# Patient Record
Sex: FEMALE | Race: WHITE | NOT HISPANIC OR LATINO | Employment: OTHER | URBAN - METROPOLITAN AREA
[De-identification: names, ages, dates, MRNs, and addresses within clinical notes are randomized per-mention and may not be internally consistent; named-entity substitution may affect disease eponyms.]

---

## 2018-06-13 PROBLEM — Z98.890 H/O LEFT KNEE SURGERY: Status: ACTIVE | Noted: 2018-06-13

## 2018-06-13 PROBLEM — M70.71 BILATERAL HIP BURSITIS: Status: ACTIVE | Noted: 2018-06-13

## 2018-06-13 PROBLEM — M46.1 SACROILIITIS: Status: ACTIVE | Noted: 2018-06-13

## 2018-06-13 PROBLEM — M70.72 BILATERAL HIP BURSITIS: Status: ACTIVE | Noted: 2018-06-13

## 2018-06-13 PROBLEM — M54.16 LUMBAR RADICULITIS: Status: ACTIVE | Noted: 2018-06-13

## 2018-07-02 ENCOUNTER — HOSPITAL ENCOUNTER (OUTPATIENT)
Dept: RADIOLOGY | Facility: HOSPITAL | Age: 48
Discharge: HOME | End: 2018-07-02
Attending: SPECIALIST
Payer: OTHER GOVERNMENT

## 2018-07-02 DIAGNOSIS — M54.16 LUMBAR RADICULOPATHY: ICD-10-CM

## 2018-07-02 PROCEDURE — 72148 MRI LUMBAR SPINE W/O DYE: CPT | Mod: TC

## 2018-07-02 PROCEDURE — 72148 MRI LUMBAR SPINE W/O DYE: CPT | Mod: 26,,, | Performed by: RADIOLOGY

## 2018-07-20 PROBLEM — M51.369 BULGING LUMBAR DISC: Status: ACTIVE | Noted: 2018-07-20

## 2018-07-20 PROBLEM — M47.816 LUMBAR SPONDYLOSIS: Status: ACTIVE | Noted: 2018-07-20

## 2018-07-20 PROBLEM — M54.16 LUMBAR RADICULOPATHY: Status: ACTIVE | Noted: 2018-07-20

## 2018-07-20 PROBLEM — M51.36 BULGING LUMBAR DISC: Status: ACTIVE | Noted: 2018-07-20

## 2018-07-20 PROBLEM — M48.061 NEURAL FORAMINAL STENOSIS OF LUMBAR SPINE: Status: ACTIVE | Noted: 2018-07-20

## 2018-07-20 PROBLEM — M51.26 PROTRUSION OF LUMBAR INTERVERTEBRAL DISC: Status: ACTIVE | Noted: 2018-07-20

## 2022-01-17 DIAGNOSIS — Z12.31 ENCOUNTER FOR SCREENING MAMMOGRAM FOR MALIGNANT NEOPLASM OF BREAST: Primary | ICD-10-CM

## 2022-06-20 ENCOUNTER — HOSPITAL ENCOUNTER (OUTPATIENT)
Dept: RADIOLOGY | Facility: HOSPITAL | Age: 52
Discharge: HOME | End: 2022-06-20
Attending: OBSTETRICS & GYNECOLOGY
Payer: OTHER GOVERNMENT

## 2022-06-20 DIAGNOSIS — Z12.31 ENCOUNTER FOR SCREENING MAMMOGRAM FOR MALIGNANT NEOPLASM OF BREAST: ICD-10-CM

## 2022-06-20 PROCEDURE — 77063 BREAST TOMOSYNTHESIS BI: CPT | Mod: TC,PO

## 2023-03-13 ENCOUNTER — HOSPITAL ENCOUNTER (OUTPATIENT)
Dept: RADIOLOGY | Facility: HOSPITAL | Age: 53
Discharge: HOME | End: 2023-03-13
Attending: EMERGENCY MEDICINE
Payer: OTHER GOVERNMENT

## 2023-03-13 DIAGNOSIS — M25.571 RIGHT ANKLE PAIN: Primary | ICD-10-CM

## 2023-03-13 DIAGNOSIS — M25.571 RIGHT ANKLE PAIN: ICD-10-CM

## 2023-03-13 PROCEDURE — 73600 X-RAY EXAM OF ANKLE: CPT | Mod: TC,PO,RT

## 2023-12-20 DIAGNOSIS — Z12.31 ENCOUNTER FOR SCREENING MAMMOGRAM FOR MALIGNANT NEOPLASM OF BREAST: Primary | ICD-10-CM

## 2024-01-27 ENCOUNTER — HOSPITAL ENCOUNTER (INPATIENT)
Facility: HOSPITAL | Age: 54
LOS: 3 days | Discharge: HOME | DRG: 287 | End: 2024-02-01
Attending: EMERGENCY MEDICINE | Admitting: INTERNAL MEDICINE
Payer: OTHER GOVERNMENT

## 2024-01-27 DIAGNOSIS — R55 CARDIAC RELATED SYNCOPE: ICD-10-CM

## 2024-01-27 DIAGNOSIS — R55 SYNCOPE: Primary | ICD-10-CM

## 2024-01-27 DIAGNOSIS — R07.9 CHEST PAIN: ICD-10-CM

## 2024-01-27 DIAGNOSIS — R94.39 POSITIVE CARDIAC STRESS TEST: ICD-10-CM

## 2024-01-27 DIAGNOSIS — I44.0 FIRST DEGREE AV BLOCK: ICD-10-CM

## 2024-01-27 DIAGNOSIS — I25.10 CAD (CORONARY ARTERY DISEASE): ICD-10-CM

## 2024-01-27 DIAGNOSIS — E87.6 HYPOKALEMIA: ICD-10-CM

## 2024-01-27 LAB
ALBUMIN SERPL BCP-MCNC: 4 G/DL (ref 3.5–5.2)
ALP SERPL-CCNC: 81 U/L (ref 55–135)
ALT SERPL W/O P-5'-P-CCNC: 17 U/L (ref 10–44)
ANION GAP SERPL CALC-SCNC: 11 MMOL/L (ref 8–16)
AST SERPL-CCNC: 16 U/L (ref 10–40)
BASOPHILS # BLD AUTO: 0.1 K/UL (ref 0–0.2)
BASOPHILS NFR BLD: 0.9 % (ref 0–1.9)
BILIRUB SERPL-MCNC: 0.4 MG/DL (ref 0.1–1)
BUN SERPL-MCNC: 17 MG/DL (ref 6–20)
CALCIUM SERPL-MCNC: 9.3 MG/DL (ref 8.7–10.5)
CHLORIDE SERPL-SCNC: 99 MMOL/L (ref 95–110)
CO2 SERPL-SCNC: 25 MMOL/L (ref 23–29)
CREAT SERPL-MCNC: 1.5 MG/DL (ref 0.5–1.4)
DIFFERENTIAL METHOD BLD: ABNORMAL
EOSINOPHIL # BLD AUTO: 0.1 K/UL (ref 0–0.5)
EOSINOPHIL NFR BLD: 0.6 % (ref 0–8)
ERYTHROCYTE [DISTWIDTH] IN BLOOD BY AUTOMATED COUNT: 13.2 % (ref 11.5–14.5)
EST. GFR  (NO RACE VARIABLE): 41.9 ML/MIN/1.73 M^2
GLUCOSE SERPL-MCNC: 117 MG/DL (ref 70–110)
GLUCOSE SERPL-MCNC: 155 MG/DL (ref 70–110)
HCT VFR BLD AUTO: 39 % (ref 37–48.5)
HGB BLD-MCNC: 12.8 G/DL (ref 12–16)
IMM GRANULOCYTES # BLD AUTO: 0.06 K/UL (ref 0–0.04)
IMM GRANULOCYTES NFR BLD AUTO: 0.5 % (ref 0–0.5)
LACTATE SERPL-SCNC: 2.5 MMOL/L (ref 0.5–1.9)
LYMPHOCYTES # BLD AUTO: 4.7 K/UL (ref 1–4.8)
LYMPHOCYTES NFR BLD: 41.2 % (ref 18–48)
MAGNESIUM SERPL-MCNC: 1.6 MG/DL (ref 1.6–2.6)
MCH RBC QN AUTO: 28.2 PG (ref 27–31)
MCHC RBC AUTO-ENTMCNC: 32.8 G/DL (ref 32–36)
MCV RBC AUTO: 86 FL (ref 82–98)
MONOCYTES # BLD AUTO: 0.8 K/UL (ref 0.3–1)
MONOCYTES NFR BLD: 6.7 % (ref 4–15)
NEUTROPHILS # BLD AUTO: 5.7 K/UL (ref 1.8–7.7)
NEUTROPHILS NFR BLD: 50.1 % (ref 38–73)
NRBC BLD-RTO: 0 /100 WBC
PHOSPHATE SERPL-MCNC: 3.5 MG/DL (ref 2.7–4.5)
PLATELET # BLD AUTO: 331 K/UL (ref 150–450)
PMV BLD AUTO: 10.1 FL (ref 9.2–12.9)
POTASSIUM SERPL-SCNC: 2.9 MMOL/L (ref 3.5–5.1)
PROT SERPL-MCNC: 7 G/DL (ref 6–8.4)
RBC # BLD AUTO: 4.54 M/UL (ref 4–5.4)
SODIUM SERPL-SCNC: 135 MMOL/L (ref 136–145)
TROPONIN I SERPL HS-MCNC: 3.5 PG/ML (ref 0–14.9)
WBC # BLD AUTO: 11.42 K/UL (ref 3.9–12.7)

## 2024-01-27 PROCEDURE — 93005 ELECTROCARDIOGRAM TRACING: CPT | Performed by: GENERAL PRACTICE

## 2024-01-27 PROCEDURE — 99285 EMERGENCY DEPT VISIT HI MDM: CPT | Mod: 25

## 2024-01-27 PROCEDURE — 93010 ELECTROCARDIOGRAM REPORT: CPT | Mod: ,,, | Performed by: GENERAL PRACTICE

## 2024-01-27 PROCEDURE — 85025 COMPLETE CBC W/AUTO DIFF WBC: CPT | Performed by: STUDENT IN AN ORGANIZED HEALTH CARE EDUCATION/TRAINING PROGRAM

## 2024-01-27 PROCEDURE — 82962 GLUCOSE BLOOD TEST: CPT

## 2024-01-27 PROCEDURE — 83605 ASSAY OF LACTIC ACID: CPT | Performed by: STUDENT IN AN ORGANIZED HEALTH CARE EDUCATION/TRAINING PROGRAM

## 2024-01-27 PROCEDURE — 84100 ASSAY OF PHOSPHORUS: CPT | Performed by: STUDENT IN AN ORGANIZED HEALTH CARE EDUCATION/TRAINING PROGRAM

## 2024-01-27 PROCEDURE — 96361 HYDRATE IV INFUSION ADD-ON: CPT

## 2024-01-27 PROCEDURE — 83735 ASSAY OF MAGNESIUM: CPT | Performed by: STUDENT IN AN ORGANIZED HEALTH CARE EDUCATION/TRAINING PROGRAM

## 2024-01-27 PROCEDURE — 63600175 PHARM REV CODE 636 W HCPCS: Performed by: STUDENT IN AN ORGANIZED HEALTH CARE EDUCATION/TRAINING PROGRAM

## 2024-01-27 PROCEDURE — 84484 ASSAY OF TROPONIN QUANT: CPT | Performed by: STUDENT IN AN ORGANIZED HEALTH CARE EDUCATION/TRAINING PROGRAM

## 2024-01-27 PROCEDURE — 80053 COMPREHEN METABOLIC PANEL: CPT | Performed by: STUDENT IN AN ORGANIZED HEALTH CARE EDUCATION/TRAINING PROGRAM

## 2024-01-27 RX ORDER — CHOLECALCIFEROL (VITAMIN D3) 25 MCG
1000 TABLET ORAL DAILY
COMMUNITY

## 2024-01-27 RX ORDER — FAMOTIDINE 20 MG/1
20 TABLET, FILM COATED ORAL NIGHTLY
COMMUNITY

## 2024-01-27 RX ORDER — HYDROCHLOROTHIAZIDE 25 MG/1
25 TABLET ORAL DAILY
Status: ON HOLD | COMMUNITY
End: 2024-02-01 | Stop reason: HOSPADM

## 2024-01-27 RX ORDER — EZETIMIBE 10 MG/1
10 TABLET ORAL DAILY
COMMUNITY

## 2024-01-27 RX ORDER — GABAPENTIN 300 MG/1
300 CAPSULE ORAL 2 TIMES DAILY
COMMUNITY

## 2024-01-27 RX ORDER — NEBIVOLOL 10 MG/1
10 TABLET ORAL NIGHTLY
Status: ON HOLD | COMMUNITY
End: 2024-02-01 | Stop reason: HOSPADM

## 2024-01-27 RX ORDER — ATORVASTATIN CALCIUM 40 MG/1
40 TABLET, FILM COATED ORAL EVERY MORNING
COMMUNITY

## 2024-01-27 RX ORDER — MAGNESIUM SULFATE HEPTAHYDRATE 40 MG/ML
2 INJECTION, SOLUTION INTRAVENOUS ONCE
Status: COMPLETED | OUTPATIENT
Start: 2024-01-28 | End: 2024-01-28

## 2024-01-27 RX ORDER — VALSARTAN 80 MG/1
80 TABLET ORAL DAILY
Status: ON HOLD | COMMUNITY
End: 2024-02-01 | Stop reason: HOSPADM

## 2024-01-27 RX ADMIN — SODIUM CHLORIDE, POTASSIUM CHLORIDE, SODIUM LACTATE AND CALCIUM CHLORIDE 1000 ML: 600; 310; 30; 20 INJECTION, SOLUTION INTRAVENOUS at 11:01

## 2024-01-27 NOTE — Clinical Note
The catheter was inserted into the and was inserted over the wire into the ostium   right coronary artery. An angiography was performed of the right coronary arteries. Multiple views were taken. The angiography was performed via power injection. The injected amount was 6 mL contrast at 3 mL/s. The PSI from the power injection was 300.

## 2024-01-28 ENCOUNTER — CLINICAL SUPPORT (OUTPATIENT)
Dept: CARDIOLOGY | Facility: HOSPITAL | Age: 54
DRG: 287 | End: 2024-01-28
Attending: INTERNAL MEDICINE
Payer: OTHER GOVERNMENT

## 2024-01-28 VITALS — WEIGHT: 250 LBS | HEIGHT: 67 IN | BODY MASS INDEX: 39.24 KG/M2

## 2024-01-28 PROBLEM — E87.6 HYPOKALEMIA: Status: ACTIVE | Noted: 2024-01-28

## 2024-01-28 PROBLEM — R55 SYNCOPE: Status: ACTIVE | Noted: 2024-01-28

## 2024-01-28 PROBLEM — I10 HYPERTENSION: Chronic | Status: ACTIVE | Noted: 2024-01-28

## 2024-01-28 PROBLEM — K21.9 GERD (GASTROESOPHAGEAL REFLUX DISEASE): Chronic | Status: ACTIVE | Noted: 2024-01-28

## 2024-01-28 PROBLEM — N17.9 AKI (ACUTE KIDNEY INJURY): Status: ACTIVE | Noted: 2024-01-28

## 2024-01-28 PROBLEM — G62.9 NEUROPATHY: Chronic | Status: ACTIVE | Noted: 2024-01-28

## 2024-01-28 PROBLEM — E87.20 LACTIC ACIDOSIS: Status: ACTIVE | Noted: 2024-01-28

## 2024-01-28 PROBLEM — E66.9 OBESITY: Chronic | Status: ACTIVE | Noted: 2024-01-28

## 2024-01-28 LAB
ANION GAP SERPL CALC-SCNC: 10 MMOL/L (ref 8–16)
AORTIC ROOT ANNULUS: 3.7 CM
AORTIC VALVE CUSP SEPERATION: 2.3 CM
AV INDEX (PROSTH): 0.84
AV MEAN GRADIENT: 3 MMHG
AV PEAK GRADIENT: 5 MMHG
AV VALVE AREA BY VELOCITY RATIO: 3.42 CM²
AV VALVE AREA: 3.48 CM²
AV VELOCITY RATIO: 0.82
BACTERIA #/AREA URNS HPF: NEGATIVE /HPF
BILIRUB UR QL STRIP: NEGATIVE
BSA FOR ECHO PROCEDURE: 2.32 M2
BUN SERPL-MCNC: 15 MG/DL (ref 6–20)
CALCIUM SERPL-MCNC: 9 MG/DL (ref 8.7–10.5)
CHLORIDE SERPL-SCNC: 102 MMOL/L (ref 95–110)
CLARITY UR: ABNORMAL
CO2 SERPL-SCNC: 25 MMOL/L (ref 23–29)
COLOR UR: YELLOW
CREAT SERPL-MCNC: 1 MG/DL (ref 0.5–1.4)
CV ECHO LV RWT: 0.41 CM
DOP CALC AO PEAK VEL: 1.08 M/S
DOP CALC AO VTI: 23.9 CM
DOP CALC LVOT AREA: 4.2 CM2
DOP CALC LVOT DIAMETER: 2.3 CM
DOP CALC LVOT PEAK VEL: 0.89 M/S
DOP CALC LVOT STROKE VOLUME: 83.05 CM3
DOP CALC MV VTI: 26.9 CM
DOP CALCLVOT PEAK VEL VTI: 20 CM
E WAVE DECELERATION TIME: 183 MSEC
E/A RATIO: 0.92
E/E' RATIO: 6.88 M/S
ECHO LV POSTERIOR WALL: 1.04 CM (ref 0.6–1.1)
ERYTHROCYTE [DISTWIDTH] IN BLOOD BY AUTOMATED COUNT: 13.2 % (ref 11.5–14.5)
EST. GFR  (NO RACE VARIABLE): >60 ML/MIN/1.73 M^2
ETHANOL SERPL-MCNC: <10 MG/DL
FRACTIONAL SHORTENING: 30 % (ref 28–44)
GLUCOSE SERPL-MCNC: 108 MG/DL (ref 70–110)
GLUCOSE UR QL STRIP: NEGATIVE
HCT VFR BLD AUTO: 35.2 % (ref 37–48.5)
HGB BLD-MCNC: 11.8 G/DL (ref 12–16)
HGB UR QL STRIP: NEGATIVE
HYALINE CASTS #/AREA URNS LPF: 3 /LPF
INTERVENTRICULAR SEPTUM: 0.83 CM (ref 0.6–1.1)
KETONES UR QL STRIP: NEGATIVE
LACTATE SERPL-SCNC: 1.3 MMOL/L (ref 0.5–1.9)
LEFT INTERNAL DIMENSION IN SYSTOLE: 3.53 CM (ref 2.1–4)
LEFT VENTRICLE DIASTOLIC VOLUME INDEX: 53.6 ML/M2
LEFT VENTRICLE DIASTOLIC VOLUME: 119 ML
LEFT VENTRICLE MASS INDEX: 75 G/M2
LEFT VENTRICLE SYSTOLIC VOLUME INDEX: 23.4 ML/M2
LEFT VENTRICLE SYSTOLIC VOLUME: 51.9 ML
LEFT VENTRICULAR INTERNAL DIMENSION IN DIASTOLE: 5.02 CM (ref 3.5–6)
LEFT VENTRICULAR MASS: 167.48 G
LEUKOCYTE ESTERASE UR QL STRIP: ABNORMAL
LV LATERAL E/E' RATIO: 5.06 M/S
LV SEPTAL E/E' RATIO: 10.75 M/S
LVOT MG: 2 MMHG
LVOT MV: 0.62 CM/S
MAGNESIUM SERPL-MCNC: 2.3 MG/DL (ref 1.6–2.6)
MCH RBC QN AUTO: 28.4 PG (ref 27–31)
MCHC RBC AUTO-ENTMCNC: 33.5 G/DL (ref 32–36)
MCV RBC AUTO: 85 FL (ref 82–98)
MICROSCOPIC COMMENT: ABNORMAL
MV MEAN GRADIENT: 2 MMHG
MV PEAK A VEL: 0.93 M/S
MV PEAK E VEL: 0.86 M/S
MV PEAK GRADIENT: 4 MMHG
MV STENOSIS PRESSURE HALF TIME: 68 MS
MV VALVE AREA BY CONTINUITY EQUATION: 3.09 CM2
MV VALVE AREA P 1/2 METHOD: 3.24 CM2
NITRITE UR QL STRIP: NEGATIVE
PH UR STRIP: 6 [PH] (ref 5–8)
PHOSPHATE SERPL-MCNC: 3.8 MG/DL (ref 2.7–4.5)
PISA TR MAX VEL: 1.96 M/S
PLATELET # BLD AUTO: 275 K/UL (ref 150–450)
PMV BLD AUTO: 10.4 FL (ref 9.2–12.9)
POTASSIUM SERPL-SCNC: 3.9 MMOL/L (ref 3.5–5.1)
PROT UR QL STRIP: NEGATIVE
PV MV: 0.68 M/S
PV PEAK GRADIENT: 4 MMHG
PV PEAK VELOCITY: 0.99 M/S
RA PRESSURE ESTIMATED: 3 MMHG
RBC # BLD AUTO: 4.15 M/UL (ref 4–5.4)
RBC #/AREA URNS HPF: 3 /HPF (ref 0–4)
RIGHT VENTRICULAR END-DIASTOLIC DIMENSION: 2.52 CM
RV TB RVSP: 5 MMHG
RV TISSUE DOPPLER FREE WALL SYSTOLIC VELOCITY 1 (APICAL 4 CHAMBER VIEW): 11.9 CM/S
SARS-COV-2 RDRP RESP QL NAA+PROBE: NEGATIVE
SODIUM SERPL-SCNC: 137 MMOL/L (ref 136–145)
SP GR UR STRIP: 1.01 (ref 1–1.03)
SQUAMOUS #/AREA URNS HPF: 2 /HPF
TDI LATERAL: 0.17 M/S
TDI SEPTAL: 0.08 M/S
TDI: 0.13 M/S
TR MAX PG: 15 MMHG
TRICUSPID ANNULAR PLANE SYSTOLIC EXCURSION: 2.2 CM
TROPONIN I SERPL HS-MCNC: 3.2 PG/ML (ref 0–14.9)
TV REST PULMONARY ARTERY PRESSURE: 18 MMHG
URN SPEC COLLECT METH UR: ABNORMAL
UROBILINOGEN UR STRIP-ACNC: NEGATIVE EU/DL
WBC # BLD AUTO: 8.35 K/UL (ref 3.9–12.7)
WBC #/AREA URNS HPF: 6 /HPF (ref 0–5)
Z-SCORE OF LEFT VENTRICULAR DIMENSION IN END DIASTOLE: -4.35
Z-SCORE OF LEFT VENTRICULAR DIMENSION IN END SYSTOLE: -2.27

## 2024-01-28 PROCEDURE — 80048 BASIC METABOLIC PNL TOTAL CA: CPT | Performed by: INTERNAL MEDICINE

## 2024-01-28 PROCEDURE — 25000003 PHARM REV CODE 250: Performed by: STUDENT IN AN ORGANIZED HEALTH CARE EDUCATION/TRAINING PROGRAM

## 2024-01-28 PROCEDURE — 36415 COLL VENOUS BLD VENIPUNCTURE: CPT | Performed by: EMERGENCY MEDICINE

## 2024-01-28 PROCEDURE — 25000003 PHARM REV CODE 250: Performed by: INTERNAL MEDICINE

## 2024-01-28 PROCEDURE — 82077 ASSAY SPEC XCP UR&BREATH IA: CPT | Performed by: EMERGENCY MEDICINE

## 2024-01-28 PROCEDURE — 93306 TTE W/DOPPLER COMPLETE: CPT

## 2024-01-28 PROCEDURE — 83735 ASSAY OF MAGNESIUM: CPT | Performed by: INTERNAL MEDICINE

## 2024-01-28 PROCEDURE — 96365 THER/PROPH/DIAG IV INF INIT: CPT

## 2024-01-28 PROCEDURE — 84484 ASSAY OF TROPONIN QUANT: CPT | Performed by: EMERGENCY MEDICINE

## 2024-01-28 PROCEDURE — U0002 COVID-19 LAB TEST NON-CDC: HCPCS | Performed by: INTERNAL MEDICINE

## 2024-01-28 PROCEDURE — 81001 URINALYSIS AUTO W/SCOPE: CPT | Performed by: INTERNAL MEDICINE

## 2024-01-28 PROCEDURE — 63600175 PHARM REV CODE 636 W HCPCS: Performed by: INTERNAL MEDICINE

## 2024-01-28 PROCEDURE — 96361 HYDRATE IV INFUSION ADD-ON: CPT

## 2024-01-28 PROCEDURE — G0378 HOSPITAL OBSERVATION PER HR: HCPCS

## 2024-01-28 PROCEDURE — 63600175 PHARM REV CODE 636 W HCPCS: Performed by: STUDENT IN AN ORGANIZED HEALTH CARE EDUCATION/TRAINING PROGRAM

## 2024-01-28 PROCEDURE — 93306 TTE W/DOPPLER COMPLETE: CPT | Mod: 26,,, | Performed by: GENERAL PRACTICE

## 2024-01-28 PROCEDURE — 85027 COMPLETE CBC AUTOMATED: CPT | Performed by: INTERNAL MEDICINE

## 2024-01-28 PROCEDURE — 84100 ASSAY OF PHOSPHORUS: CPT | Performed by: INTERNAL MEDICINE

## 2024-01-28 PROCEDURE — 83605 ASSAY OF LACTIC ACID: CPT | Performed by: INTERNAL MEDICINE

## 2024-01-28 PROCEDURE — 96366 THER/PROPH/DIAG IV INF ADDON: CPT

## 2024-01-28 RX ORDER — ATORVASTATIN CALCIUM 40 MG/1
40 TABLET, FILM COATED ORAL NIGHTLY
Status: DISCONTINUED | OUTPATIENT
Start: 2024-01-28 | End: 2024-02-01 | Stop reason: HOSPADM

## 2024-01-28 RX ORDER — IBUPROFEN 200 MG
16 TABLET ORAL
Status: DISCONTINUED | OUTPATIENT
Start: 2024-01-28 | End: 2024-02-01 | Stop reason: HOSPADM

## 2024-01-28 RX ORDER — SODIUM CHLORIDE 0.9 % (FLUSH) 0.9 %
10 SYRINGE (ML) INJECTION EVERY 6 HOURS PRN
Status: DISCONTINUED | OUTPATIENT
Start: 2024-01-28 | End: 2024-02-01 | Stop reason: HOSPADM

## 2024-01-28 RX ORDER — SODIUM,POTASSIUM PHOSPHATES 280-250MG
2 POWDER IN PACKET (EA) ORAL
Status: DISCONTINUED | OUTPATIENT
Start: 2024-01-28 | End: 2024-02-01 | Stop reason: HOSPADM

## 2024-01-28 RX ORDER — GLUCAGON 1 MG
1 KIT INJECTION
Status: DISCONTINUED | OUTPATIENT
Start: 2024-01-28 | End: 2024-02-01 | Stop reason: HOSPADM

## 2024-01-28 RX ORDER — IBUPROFEN 200 MG
24 TABLET ORAL
Status: DISCONTINUED | OUTPATIENT
Start: 2024-01-28 | End: 2024-02-01 | Stop reason: HOSPADM

## 2024-01-28 RX ORDER — GABAPENTIN 300 MG/1
300 CAPSULE ORAL 2 TIMES DAILY
Status: DISCONTINUED | OUTPATIENT
Start: 2024-01-28 | End: 2024-02-01 | Stop reason: HOSPADM

## 2024-01-28 RX ORDER — ENOXAPARIN SODIUM 100 MG/ML
40 INJECTION SUBCUTANEOUS EVERY 24 HOURS
Status: DISCONTINUED | OUTPATIENT
Start: 2024-01-28 | End: 2024-01-29

## 2024-01-28 RX ORDER — PANTOPRAZOLE SODIUM 40 MG/1
40 TABLET, DELAYED RELEASE ORAL DAILY
Status: DISCONTINUED | OUTPATIENT
Start: 2024-01-28 | End: 2024-01-29

## 2024-01-28 RX ORDER — SODIUM CHLORIDE, SODIUM LACTATE, POTASSIUM CHLORIDE, CALCIUM CHLORIDE 600; 310; 30; 20 MG/100ML; MG/100ML; MG/100ML; MG/100ML
INJECTION, SOLUTION INTRAVENOUS CONTINUOUS
Status: ACTIVE | OUTPATIENT
Start: 2024-01-28 | End: 2024-01-28

## 2024-01-28 RX ORDER — LANOLIN ALCOHOL/MO/W.PET/CERES
800 CREAM (GRAM) TOPICAL
Status: DISCONTINUED | OUTPATIENT
Start: 2024-01-28 | End: 2024-02-01 | Stop reason: HOSPADM

## 2024-01-28 RX ORDER — ACETAMINOPHEN 325 MG/1
650 TABLET ORAL EVERY 4 HOURS PRN
Status: DISCONTINUED | OUTPATIENT
Start: 2024-01-28 | End: 2024-02-01 | Stop reason: HOSPADM

## 2024-01-28 RX ORDER — FAMOTIDINE 20 MG/1
40 TABLET, FILM COATED ORAL NIGHTLY
Status: DISCONTINUED | OUTPATIENT
Start: 2024-01-28 | End: 2024-01-28

## 2024-01-28 RX ORDER — ONDANSETRON HYDROCHLORIDE 2 MG/ML
4 INJECTION, SOLUTION INTRAVENOUS EVERY 8 HOURS PRN
Status: DISCONTINUED | OUTPATIENT
Start: 2024-01-28 | End: 2024-02-01 | Stop reason: HOSPADM

## 2024-01-28 RX ORDER — NALOXONE HCL 0.4 MG/ML
0.02 VIAL (ML) INJECTION
Status: DISCONTINUED | OUTPATIENT
Start: 2024-01-28 | End: 2024-02-01 | Stop reason: HOSPADM

## 2024-01-28 RX ORDER — FAMOTIDINE 20 MG/1
20 TABLET, FILM COATED ORAL NIGHTLY
Status: DISCONTINUED | OUTPATIENT
Start: 2024-01-28 | End: 2024-01-28

## 2024-01-28 RX ORDER — EZETIMIBE 10 MG/1
10 TABLET ORAL DAILY
Status: DISCONTINUED | OUTPATIENT
Start: 2024-01-28 | End: 2024-02-01 | Stop reason: HOSPADM

## 2024-01-28 RX ADMIN — SODIUM CHLORIDE, SODIUM LACTATE, POTASSIUM CHLORIDE, AND CALCIUM CHLORIDE: .6; .31; .03; .02 INJECTION, SOLUTION INTRAVENOUS at 02:01

## 2024-01-28 RX ADMIN — GABAPENTIN 300 MG: 300 CAPSULE ORAL at 01:01

## 2024-01-28 RX ADMIN — POTASSIUM BICARBONATE 40 MEQ: 782 TABLET, EFFERVESCENT ORAL at 12:01

## 2024-01-28 RX ADMIN — SODIUM CHLORIDE, POTASSIUM CHLORIDE, SODIUM LACTATE AND CALCIUM CHLORIDE 1000 ML: 600; 310; 30; 20 INJECTION, SOLUTION INTRAVENOUS at 12:01

## 2024-01-28 RX ADMIN — EZETIMIBE 10 MG: 10 TABLET ORAL at 10:01

## 2024-01-28 RX ADMIN — MAGNESIUM SULFATE HEPTAHYDRATE 2 G: 40 INJECTION, SOLUTION INTRAVENOUS at 01:01

## 2024-01-28 RX ADMIN — PANTOPRAZOLE SODIUM 40 MG: 40 TABLET, DELAYED RELEASE ORAL at 06:01

## 2024-01-28 RX ADMIN — GABAPENTIN 300 MG: 300 CAPSULE ORAL at 09:01

## 2024-01-28 RX ADMIN — ATORVASTATIN CALCIUM 40 MG: 40 TABLET, FILM COATED ORAL at 09:01

## 2024-01-28 NOTE — NURSING
Dr Martinez notified that echo has resulted.  MD states patient will stay overnight for continued obs.  Patient updated on poc.

## 2024-01-28 NOTE — ED PROVIDER NOTES
Encounter Date: 2024       History     Chief Complaint   Patient presents with    Loss of Consciousness     Possible syncopal episode, sitting at table and became pale and diaphoretic, nausea, lightheaded and dizzy. Family states pt looked as if she passed out, when came to, she had trouble speaking and couldn't hold both arms up. Pt bp was 90s systolic.     HPI    53-year-old female with past medical history of hypertension presents to the emergency department with complaint of a syncopal event that occurred at around 9:30 p.m. today.  Event occurred while they were sitting at a table at home.  Patient states that she had two alcoholic beverages, became nauseated warm and lightheaded.  Patient's  who witnessed the events that he noticed patient became pale diaphoretic and became unresponsive.  Patient denies any chest pain, vomiting, shortness for breath before or after the episode.  No convulsions were noted by patient's  who witnessed the event.  Patient denies any prior syncopal events.  No recent medication changes.  Denies any head injury.    When event occurred patient's  recorded her blood pressure and heart rate at home after she regained consciousness she was noted to be hypotensive with blood pressure as low as the 70s systolic and heart rate in the 60s.      Review of patient's allergies indicates:  No Known Allergies  Past Medical History:   Diagnosis Date    Heartburn      Past Surgical History:   Procedure Laterality Date    ANTERIOR CRUCIATE LIGAMENT REPAIR      AUGMENTATION OF BREAST      BREAST SURGERY       SECTION      CHOLECYSTECTOMY      FOOT SURGERY      TONSILLECTOMY       No family history on file.  Social History     Tobacco Use    Smoking status: Former     Types: Cigarettes    Smokeless tobacco: Former     Review of Systems   Constitutional:  Negative for fever.   HENT:  Negative for sore throat.    Respiratory:  Negative for shortness of breath.     Cardiovascular:  Negative for chest pain.   Gastrointestinal:  Positive for nausea.   Genitourinary:  Negative for dysuria.   Musculoskeletal:  Negative for back pain.   Skin:  Negative for rash.   Neurological:  Positive for syncope and light-headedness. Negative for weakness.   Hematological:  Does not bruise/bleed easily.       Physical Exam     Initial Vitals [01/27/24 2226]   BP Pulse Resp Temp SpO2   (!) 121/56 77 18 97.2 °F (36.2 °C) 97 %      MAP       --         Physical Exam    Vitals reviewed.  Constitutional: She appears well-developed and well-nourished.  Non-toxic appearance.   HENT:   Head: Normocephalic and atraumatic.   Eyes: EOM are normal. Pupils are equal, round, and reactive to light.   Neck: Neck supple.   Normal range of motion.  Cardiovascular:  Normal rate and regular rhythm.           Pulmonary/Chest: Breath sounds normal. No respiratory distress.   Abdominal: Abdomen is soft. There is no abdominal tenderness.   Musculoskeletal:      Cervical back: Normal range of motion and neck supple.     Neurological: She is alert and oriented to person, place, and time. No sensory deficit. GCS score is 15. GCS eye subscore is 4. GCS verbal subscore is 5. GCS motor subscore is 6.   Alert and oriented x4  GCS 15  5/5  strength bilaterally  5/5 strength on elbow flexion/extension  5/5 strength on plantarflexion and dorsiflexion  5/5 strength on hip flexion and extension  Sensation intact throughout  CN II-XII intact  No facial asymmetry  No pronator drift  Intact finger to nose     Skin: Skin is warm and dry. Capillary refill takes less than 2 seconds.         ED Course   Procedures  Labs Reviewed   CBC W/ AUTO DIFFERENTIAL - Abnormal; Notable for the following components:       Result Value    Immature Grans (Abs) 0.06 (*)     All other components within normal limits   COMPREHENSIVE METABOLIC PANEL - Abnormal; Notable for the following components:    Sodium 135 (*)     Potassium 2.9 (*)      Glucose 117 (*)     Creatinine 1.5 (*)     eGFR 41.9 (*)     All other components within normal limits   LACTIC ACID, PLASMA - Abnormal; Notable for the following components:    Lactate (Lactic Acid) 2.5 (*)     All other components within normal limits   POCT GLUCOSE - Abnormal; Notable for the following components:    POC Glucose 155 (*)     All other components within normal limits   MAGNESIUM   PHOSPHORUS   TROPONIN I HIGH SENSITIVITY   TROPONIN I HIGH SENSITIVITY   ALCOHOL,MEDICAL (ETHANOL)   ALCOHOL,MEDICAL (ETHANOL)   POCT GLUCOSE MONITORING CONTINUOUS   POCT GLUCOSE MONITORING CONTINUOUS     EKG Readings: (Independently Interpreted)   Sinus rhythm with first-degree AV block at a rate of 72 beats per minute with T-wave inversions in V1 and V2 with ST depressions.  Also noted to have T-wave inversion in 1, aVL and V5.  No ST elevation.  Appears to have a left axis deviation.       Imaging Results    None          Medications   lactated ringers bolus 1,000 mL (1,000 mLs Intravenous New Bag 1/27/24 5897)   potassium bicarbonate disintegrating tablet 40 mEq (has no administration in time range)   lactated ringers bolus 1,000 mL (has no administration in time range)   magnesium sulfate 2g in water 50mL IVPB (premix) (has no administration in time range)     Medical Decision Making  53-year-old female with past medical history of hypertension presents to the emergency department with complaint of syncopal event that occurred while at home lasting approximately 2 minutes.  Was in the seated position, began to feel warmth, nauseated and lightheaded.  Was witnessed to become unresponsive for approximately 2 minutes.  No associated chest pain, shortness breast, vomiting.  No head injury.  Initial vital signs within normal limits.  Physical examination as stated above.    Differential includes but is not limited to cardiogenic syncope, neurogenic syncope, vasovagal syncope, electrolyte abnormality, metabolic  derangement, hypoglycemia, seizure, CVA, TIA, symptomatic bradycardia.  Less likely CVA/TIA given description of the event that occurred based on patient's physical examination.    Please refer to the ED course for patient's workup.    Amount and/or Complexity of Data Reviewed  Labs: ordered. Decision-making details documented in ED Course.    Risk  Prescription drug management.  Decision regarding hospitalization.               ED Course as of 01/28/24 0000   Sat Jan 27, 2024   2345 Lactate, Reymundo(!!): 2.5 [SB]   2347 Potassium(!!): 2.9 [SB]   2357 CBC auto differential(!)  Independently interpreted by me, returned within normal limits. [SB]   2358 Comprehensive metabolic panel(!!)  Independently interpreted by me, returned with potassium reduced at 2.9 creatinine elevated at 1.5 and sodium mildly reduced at 135. [SB]   2358 POCT glucose(!)  Independently interpreted by me, elevated at 155 [SB]   2358 Lactic acid, plasma(!!)  Independently interpreted by me, lactate of 2.5 [SB]   2358 Magnesium  Independently interpreted by me returned at 1.6 [SB]   2358 TROPONIN I HIGH SENSITIVITY  Independently interpreted by me returned within normal limits at 3.5 [SB]   2358 Phosphorus  Independently interpreted by me returned within normal limits at 3.5 [SB]   2358 Patient patient was given a L of LR, blood pressure did drop to 94/54 on bedside ultrasound was performed and patient with EF that appeared to be greater than 55% and collapsible IVC.  A 2nd bolus of LR was ordered.  Given hypokalemia 40 mEq of potassium was given as well as 2 g of magnesium.  Patient was ultimately consulted to the medicine team for admission for high-risk syncope.  Case was discussed with Dr. Lucas [SB]      ED Course User Index  [SB] Miguel Parsons MD                             Clinical Impression:  Final diagnoses:  [R55] Syncope (Primary)  [I44.0] First degree AV block  [E87.6] Hypokalemia          ED Disposition Condition    Admit Stable                 Miguel Parsons MD  Resident  01/28/24 0000

## 2024-01-28 NOTE — SUBJECTIVE & OBJECTIVE
Past Medical History:   Diagnosis Date    Heartburn        Past Surgical History:   Procedure Laterality Date    ANTERIOR CRUCIATE LIGAMENT REPAIR      AUGMENTATION OF BREAST      BREAST SURGERY       SECTION      CHOLECYSTECTOMY      FOOT SURGERY      TONSILLECTOMY         Review of patient's allergies indicates:  No Known Allergies    No current facility-administered medications on file prior to encounter.     Current Outpatient Medications on File Prior to Encounter   Medication Sig    acetaminophen (TYLENOL ORAL) Take by mouth.    atorvastatin (LIPITOR) 40 MG tablet Take 40 mg by mouth every morning.    dextroamphetamine-amphetamine (ADDERALL XR) 20 MG 24 hr capsule Take 20 mg by mouth every morning.    ezetimibe (ZETIA) 10 mg tablet Take 10 mg by mouth once daily.    famotidine (PEPCID) 20 MG tablet Take 20 mg by mouth once daily.    gabapentin (NEURONTIN) 300 MG capsule Take 300 mg by mouth 2 (two) times a day.    hydroCHLOROthiazide (HYDRODIURIL) 25 MG tablet Take 25 mg by mouth once daily.    ibuprofen (ADVIL,MOTRIN) 800 MG tablet Take 1 tablet (800 mg total) by mouth 3 (three) times daily as needed for Pain.    IBUPROFEN ORAL Take by mouth.    nebivoloL (BYSTOLIC) 10 MG Tab Take 10 mg by mouth every evening.    omeprazole (PRILOSEC) 20 MG capsule Take 20 mg by mouth 2 (two) times a day.    traMADol (ULTRAM) 50 mg tablet Take 1 tablet (50 mg total) by mouth every 6 to 8 hours as needed for Pain (Rx# 2083).    valsartan (DIOVAN) 80 MG tablet Take 80 mg by mouth once daily.    vitamin D (VITAMIN D3) 1000 units Tab Take 1,000 Units by mouth once daily.     Family History       Problem Relation (Age of Onset)    Heart disease Father          Tobacco Use    Smoking status: Former     Types: Cigarettes    Smokeless tobacco: Former    Tobacco comments:     Ex smoker 12 years   Substance and Sexual Activity    Alcohol use: Yes     Comment: occassionally    Drug use: Not on file    Sexual activity: Not on  file     Review of Systems   Constitutional:  Negative for fever.   HENT:  Positive for congestion.    Respiratory:  Negative for shortness of breath.    Cardiovascular:  Negative for chest pain.   Gastrointestinal:  Positive for nausea.   Genitourinary:  Negative for dysuria.   Neurological:  Positive for syncope and weakness.   Psychiatric/Behavioral:  Negative for confusion.      Objective:     Vital Signs (Most Recent):  Temp: 97.2 °F (36.2 °C) (01/27/24 2226)  Pulse: 71 (01/27/24 2330)  Resp: 18 (01/27/24 2226)  BP: (!) 94/54 (01/27/24 2330)  SpO2: 98 % (01/27/24 2330) Vital Signs (24h Range):  Temp:  [97.2 °F (36.2 °C)] 97.2 °F (36.2 °C)  Pulse:  [71-77] 71  Resp:  [18] 18  SpO2:  [97 %-98 %] 98 %  BP: ()/(54-57) 94/54     Weight: 113.4 kg (250 lb)  Body mass index is 39.16 kg/m².     Physical Exam  Vitals and nursing note reviewed.   Constitutional:       General: She is not in acute distress.     Appearance: She is obese. She is not ill-appearing, toxic-appearing or diaphoretic.   HENT:      Head: Normocephalic and atraumatic.      Mouth/Throat:      Mouth: Mucous membranes are moist.      Comments: No oral thrush   Eyes:      General:         Right eye: No discharge.         Left eye: No discharge.   Cardiovascular:      Rate and Rhythm: Normal rate and regular rhythm.   Pulmonary:      Effort: No respiratory distress.      Breath sounds: Normal breath sounds. No stridor. No wheezing.      Comments: On RA  Abdominal:      General: Bowel sounds are normal. There is no distension.      Palpations: Abdomen is soft.      Tenderness: There is no abdominal tenderness. There is no guarding.   Musculoskeletal:      Cervical back: Neck supple.   Skin:     General: Skin is warm.   Neurological:      General: No focal deficit present.      Mental Status: She is alert and oriented to person, place, and time. Mental status is at baseline.   Psychiatric:         Mood and Affect: Mood normal.               "  Significant Labs: BMP:   Recent Labs   Lab 01/27/24 2239   *   *   K 2.9*   CL 99   CO2 25   BUN 17   CREATININE 1.5*   CALCIUM 9.3   MG 1.6     CBC:   Recent Labs   Lab 01/27/24 2239   WBC 11.42   HGB 12.8   HCT 39.0        CMP:   Recent Labs   Lab 01/27/24 2239   *   K 2.9*   CL 99   CO2 25   *   BUN 17   CREATININE 1.5*   CALCIUM 9.3   PROT 7.0   ALBUMIN 4.0   BILITOT 0.4   ALKPHOS 81   AST 16   ALT 17   ANIONGAP 11     Cardiac Markers: No results for input(s): "CKMB", "MYOGLOBIN", "BNP", "TROPISTAT" in the last 48 hours.  Lactic Acid:   Recent Labs   Lab 01/27/24 2239   LACTATE 2.5*     Magnesium:   Recent Labs   Lab 01/27/24 2239   MG 1.6     Respiratory Culture: No results for input(s): "GSRESP", "RESPIRATORYC" in the last 48 hours.  Urine Culture: No results for input(s): "LABURIN" in the last 48 hours.  Urine Studies: No results for input(s): "COLORU", "APPEARANCEUA", "PHUR", "SPECGRAV", "PROTEINUA", "GLUCUA", "KETONESU", "BILIRUBINUA", "OCCULTUA", "NITRITE", "UROBILINOGEN", "LEUKOCYTESUR", "RBCUA", "WBCUA", "BACTERIA", "SQUAMEPITHEL", "HYALINECASTS" in the last 48 hours.    Invalid input(s): "WRIGHTSUR"    Significant Imaging: I have reviewed all pertinent imaging results/findings within the past 24 hours.    No results found.    "

## 2024-01-28 NOTE — PLAN OF CARE
"[facility]  Initial Discharge Assessment       Primary Care Provider: Tim Burton MD    Admission Diagnosis: Syncope [R55]    Admission Date: 1/27/2024  Expected Discharge Date:   Assessment completed with pt at bedside. Pt AAOx4s. Demographics, PCP, and insurance verified. No home health. No dialysis. Pt reports ability to complete ADLs without any assistance. Pt verbalized plan to discharge home via spouse transport. Pt has no other needs to be addressed at this time.     Transition of Care Barriers: None    Payor: [employer] / Plan: [place] [employer] / Product Type: Government /     Extended Emergency Contact Information  Primary Emergency Contact: Tim Bryant"  Address: [address] [place] [address] [id number] [place] [address]  Home Phone: [phone]  Mobile Phone: [phone]  Relation: Spouse  Preferred language: English   needed? No    Discharge Plan A: Home with family  Discharge Plan B: Home with family      [facility] [address]  [address] [place] [address]  Phone: [phone] Fax: [phone]      Initial Assessment (most recent)       Adult Discharge Assessment - 01/28/24 1545          Discharge Assessment    Assessment Type Discharge Planning Assessment     Confirmed/corrected address, phone number and insurance Yes     Confirmed Demographics Correct on Facesheet     Source of Information patient;health record     Reason For Admission Syncope     People in Home spouse     Facility Arrived From: Home     Do you expect to return to your current living situation? Yes     Do you have help at home or someone to help you manage your care at home? Yes     Who are your caregiver(s) and their phone number(s)? Tim Bryant" (Spouse) [phone] (Mobile)     Prior to hospitilization cognitive status: Alert/Oriented     Current cognitive status: Alert/Oriented     Walking or Climbing Stairs " "Difficulty no     Dressing/Bathing Difficulty no     Home Layout Able to live on 1st floor     Equipment Currently Used at Home none     Readmission within 30 days? No     Patient currently being followed by outpatient case management? No     Do you currently have service(s) that help you manage your care at home? No     Do you take prescription medications? Yes     Do you have prescription coverage? Yes     Coverage [facility] [place] -     Do you have any problems affording any of your prescribed medications? No     Is the patient taking medications as prescribed? yes     Who is going to help you get home at discharge? Tim Bryant "[place]" (Spouse) [phone] (Mobile)     How do you get to doctors appointments? car, drives self;family or friend will provide     Are you on dialysis? No     Do you take coumadin? No     Discharge Plan A Home with family     Discharge Plan B Home with family     DME Needed Upon Discharge  none     Discharge Plan discussed with: Patient     Transition of Care Barriers None                                "

## 2024-01-28 NOTE — H&P
Formerly [facility] - Emergency Dept  Hospital Medicine  History & Physical    Patient Name: Emily Bryant  MRN: [id number]  Patient Class: OP- Observation  Admission Date: 1/27/2024  Attending Physician: Aaliyah Perrin MD   Primary Care Provider: Tim Burton MD         Patient information was obtained from patient, relative(s), and ER records.     Subjective:     Principal Problem:<principal problem not specified>    Chief Complaint:   Chief Complaint   Patient presents with    Loss of Consciousness     Possible syncopal episode, sitting at table and became pale and diaphoretic, nausea, lightheaded and dizzy. Family states pt looked as if she passed out, when came to, she had trouble speaking and couldn't hold both arms up. Pt bp was 90s systolic.        HPI: Mrs. Bryant is a 53-years-old female who presented to the ED with complaints of syncope.  Collateral history obtained from family member present at bedside.  Patient states she was in her usual state of health, sitting at a table, playing cards with family, had consumed 3 alcoholic beverages.  Sudden onset of feeling unwell, dizzy sensation described as room spinning, nauseous, diaphoresis, family member stated she subsequently was not verbalizing for about 30 seconds, then slumped over, body became flaccid, episode lasted about 20 seconds before she became responsive, generalized weakness continued for about 1 minute before she returned to baseline.  She denies any preceding chest pain, shortness of breath, focal weakness of extremities, associated incontinence or seizure activity.  No similar episodes in the past.  States has been experiencing congestion over the last few days but denies any fever or chills.  No diarrhea, cystitis symptoms, lower extremity swelling or calf tenderness. Following this episode family member checked vitals, reported SBP in 90s and heart rate in 60s. Patient states potassium has been running low over the  last few months, she is on hydrochlorothiazide for blood pressure control.  In the ED blood pressure borderline 94/54, heart rate in 70s, afebrile, on room air.  Labs with WBC 11, hemoglobin 12.8, sodium 135, potassium 2.9, magnesium 1.6, BUN/creatinine 17/1.5, lactic acid 2.5, glucose 155, troponin 3.5.  EKG sinus rhythm with first-degree with T-wave inversion V1/V2.  She received IV fluid, 40 mEq oral potassium and 2 g IV magnesium supplementation in the ED.  Case discussed with ED provider who requested admission for high-risk syncope.  Plan of care discussed with patient and family member at bedside.    Past Medical History:   Diagnosis Date    Heartburn        Past Surgical History:   Procedure Laterality Date    ANTERIOR CRUCIATE LIGAMENT REPAIR      AUGMENTATION OF BREAST      BREAST SURGERY       SECTION      CHOLECYSTECTOMY      FOOT SURGERY      TONSILLECTOMY         Review of patient's allergies indicates:  No Known Allergies    No current facility-administered medications on file prior to encounter.     Current Outpatient Medications on File Prior to Encounter   Medication Sig    acetaminophen (TYLENOL ORAL) Take by mouth.    atorvastatin (LIPITOR) 40 MG tablet Take 40 mg by mouth every morning.    dextroamphetamine-amphetamine (ADDERALL XR) 20 MG 24 hr capsule Take 20 mg by mouth every morning.    ezetimibe (ZETIA) 10 mg tablet Take 10 mg by mouth once daily.    famotidine (PEPCID) 20 MG tablet Take 20 mg by mouth once daily.    gabapentin (NEURONTIN) 300 MG capsule Take 300 mg by mouth 2 (two) times a day.    hydroCHLOROthiazide (HYDRODIURIL) 25 MG tablet Take 25 mg by mouth once daily.    ibuprofen (ADVIL,MOTRIN) 800 MG tablet Take 1 tablet (800 mg total) by mouth 3 (three) times daily as needed for Pain.    IBUPROFEN ORAL Take by mouth.    nebivoloL (BYSTOLIC) 10 MG Tab Take 10 mg by mouth every evening.    omeprazole (PRILOSEC) 20 MG capsule Take 20 mg by mouth 2 (two) times a day.     traMADol (ULTRAM) 50 mg tablet Take 1 tablet (50 mg total) by mouth every 6 to 8 hours as needed for Pain (Rx# 2083).    valsartan (DIOVAN) 80 MG tablet Take 80 mg by mouth once daily.    vitamin D (VITAMIN D3) 1000 units Tab Take 1,000 Units by mouth once daily.     Family History       Problem Relation (Age of Onset)    Heart disease Father          Tobacco Use    Smoking status: Former     Types: Cigarettes    Smokeless tobacco: Former    Tobacco comments:     Ex smoker 12 years   Substance and Sexual Activity    Alcohol use: Yes     Comment: occassionally    Drug use: Not on file    Sexual activity: Not on file     Review of Systems   Constitutional:  Negative for fever.   HENT:  Positive for congestion.    Respiratory:  Negative for shortness of breath.    Cardiovascular:  Negative for chest pain.   Gastrointestinal:  Positive for nausea.   Genitourinary:  Negative for dysuria.   Neurological:  Positive for syncope and weakness.   Psychiatric/Behavioral:  Negative for confusion.      Objective:     Vital Signs (Most Recent):  Temp: 97.2 °F (36.2 °C) (01/27/24 2226)  Pulse: 71 (01/27/24 2330)  Resp: 18 (01/27/24 2226)  BP: (!) 94/54 (01/27/24 2330)  SpO2: 98 % (01/27/24 2330) Vital Signs (24h Range):  Temp:  [97.2 °F (36.2 °C)] 97.2 °F (36.2 °C)  Pulse:  [71-77] 71  Resp:  [18] 18  SpO2:  [97 %-98 %] 98 %  BP: ()/(54-57) 94/54     Weight: 113.4 kg (250 lb)  Body mass index is 39.16 kg/m².     Physical Exam  Vitals and nursing note reviewed.   Constitutional:       General: She is not in acute distress.     Appearance: She is obese. She is not ill-appearing, toxic-appearing or diaphoretic.   HENT:      Head: Normocephalic and atraumatic.      Mouth/Throat:      Mouth: Mucous membranes are moist.      Comments: No oral thrush   Eyes:      General:         Right eye: No discharge.         Left eye: No discharge.   Cardiovascular:      Rate and Rhythm: Normal rate and regular rhythm.   Pulmonary:      Effort:  "No respiratory distress.      Breath sounds: Normal breath sounds. No stridor. No wheezing.      Comments: On RA  Abdominal:      General: Bowel sounds are normal. There is no distension.      Palpations: Abdomen is soft.      Tenderness: There is no abdominal tenderness. There is no guarding.   Musculoskeletal:      Cervical back: Neck supple.   Skin:     General: Skin is warm.   Neurological:      General: No focal deficit present.      Mental Status: She is alert and oriented to person, place, and time. Mental status is at baseline.   Psychiatric:         Mood and Affect: Mood normal.                Significant Labs: BMP:   Recent Labs   Lab 01/27/24 2239   *   *   K 2.9*   CL 99   CO2 25   BUN 17   CREATININE 1.5*   CALCIUM 9.3   MG 1.6     CBC:   Recent Labs   Lab 01/27/24 2239   WBC 11.42   HGB 12.8   HCT 39.0        CMP:   Recent Labs   Lab 01/27/24 2239   *   K 2.9*   CL 99   CO2 25   *   BUN 17   CREATININE 1.5*   CALCIUM 9.3   PROT 7.0   ALBUMIN 4.0   BILITOT 0.4   ALKPHOS 81   AST 16   ALT 17   ANIONGAP 11     Cardiac Markers: No results for input(s): "CKMB", "MYOGLOBIN", "BNP", "TROPISTAT" in the last 48 hours.  Lactic Acid:   Recent Labs   Lab 01/27/24 2239   LACTATE 2.5*     Magnesium:   Recent Labs   Lab 01/27/24 2239   MG 1.6     Respiratory Culture: No results for input(s): "GSRESP", "RESPIRATORYC" in the last 48 hours.  Urine Culture: No results for input(s): "LABURIN" in the last 48 hours.  Urine Studies: No results for input(s): "COLORU", "APPEARANCEUA", "PHUR", "SPECGRAV", "PROTEINUA", "GLUCUA", "KETONESU", "BILIRUBINUA", "OCCULTUA", "NITRITE", "UROBILINOGEN", "LEUKOCYTESUR", "RBCUA", "WBCUA", "BACTERIA", "SQUAMEPITHEL", "HYALINECASTS" in the last 48 hours.    Invalid input(s): "WRIGHTSUR"    Significant Imaging: I have reviewed all pertinent imaging results/findings within the past 24 hours.    No results found.    Assessment/Plan:     Active Hospital " Problems    Diagnosis  POA    Syncope [R55]  Yes     Priority: 1 - High    JO (acute kidney injury) [N17.9]  Yes     Priority: 2     Hypokalemia [E87.6]  Yes     Priority: 3     Lactic acidosis [E87.20]  Yes     Priority: 4     Obesity [E66.9]  Yes     Chronic    Hypertension [I10]  Yes     Chronic    GERD (gastroesophageal reflux disease) [K21.9]  Yes     Chronic    Neuropathy [G62.9]  Yes     Chronic      Resolved Hospital Problems   No resolved problems to display.     Plan:  Admit observation unit, continuous cardiac telemetry monitoring   IV fluid hydration with LR at 75 cc/hour   Hold home hydrochlorothiazide and valsartan, monitoring blood pressure and renal function   Repeat lactic acid pending  Serial high sensitivity troponin   Check and document orthostatic blood pressure   EKG with first-degree with T-wave inversion V1/V2, repeat 12 lead EKG in a.m. with correction of electrolyte, consider further workup pending same  Transthoracic echocardiogram ordered   Check ethanol level  Await COVID-19 rapid screen  Status post 40 mEq potassium and 2 g IV magnesium supplementation in the ED, trending levels  Neurological examination is nonfocal, monitoring   Electrolytes sliding scale repletion  A.m. labs ordered  Further plan as per hospital course    VTE Risk Mitigation (From admission, onward)      None               On 01/28/2024, patient should be placed in hospital observation services under my care.             Aaliyah Perrin MD  Department of Hospital Medicine  [facility] - Emergency Dept

## 2024-01-28 NOTE — NURSING
Nurses Note -- 4 Eyes      1/28/2024   3:10 PM      Skin assessed during: Admit      [x] No Altered Skin Integrity Present    []Prevention Measures Documented      [] Yes- Altered Skin Integrity Present or Discovered   [] LDA Added if Not in Epic (Describe Wound)   [] New Altered Skin Integrity was Present on Admit and Documented in LDA   [] Wound Image Taken    Wound Care Consulted? No    Attending Nurse:  Lorene Noel RN/Staff Member:   INES Pettit LPN

## 2024-01-28 NOTE — HPI
Mrs. Bryant is a 53-years-old female who presented to the ED with complaints of syncope.  Collateral history obtained from family member present at bedside.  Patient states she was in her usual state of health, sitting at a table, playing cards with family, had consumed 3 alcoholic beverages.  Sudden onset of feeling unwell, dizzy sensation described as room spinning, nauseous, diaphoresis, family member stated she subsequently was not verbalizing for about 30 seconds, then slumped over, body became flaccid, episode lasted about 20 seconds before she became responsive, generalized weakness continued for about 1 minute before she returned to baseline.  She denies any preceding chest pain, shortness of breath, focal weakness of extremities, associated incontinence or seizure activity.  No similar episodes in the past.  States has been experiencing congestion over the last few days but denies any fever or chills.  No diarrhea, cystitis symptoms, lower extremity swelling or calf tenderness. Following this episode family member checked vitals, reported SBP in 90s and heart rate in 60s. Patient states potassium has been running low over the last few months, she is on hydrochlorothiazide for blood pressure control.  In the ED blood pressure borderline 94/54, heart rate in 70s, afebrile, on room air.  Labs with WBC 11, hemoglobin 12.8, sodium 135, potassium 2.9, magnesium 1.6, BUN/creatinine 17/1.5, lactic acid 2.5, glucose 155, troponin 3.5.  EKG sinus rhythm with first-degree with T-wave inversion V1/V2.  She received IV fluid, 40 mEq oral potassium and 2 g IV magnesium supplementation in the ED.  Case discussed with ED provider who requested admission for high-risk syncope.  Plan of care discussed with patient and family member at bedside.

## 2024-01-29 LAB
ANION GAP SERPL CALC-SCNC: 9 MMOL/L (ref 8–16)
BUN SERPL-MCNC: 12 MG/DL (ref 6–20)
CALCIUM SERPL-MCNC: 9.3 MG/DL (ref 8.7–10.5)
CHLORIDE SERPL-SCNC: 100 MMOL/L (ref 95–110)
CO2 SERPL-SCNC: 28 MMOL/L (ref 23–29)
CREAT SERPL-MCNC: 0.8 MG/DL (ref 0.5–1.4)
EST. GFR  (NO RACE VARIABLE): >60 ML/MIN/1.73 M^2
GLUCOSE SERPL-MCNC: 109 MG/DL (ref 70–110)
MAGNESIUM SERPL-MCNC: 2 MG/DL (ref 1.6–2.6)
POTASSIUM SERPL-SCNC: 3.9 MMOL/L (ref 3.5–5.1)
SODIUM SERPL-SCNC: 137 MMOL/L (ref 136–145)
T4 FREE SERPL-MCNC: 0.99 NG/DL (ref 0.71–1.51)
TSH SERPL DL<=0.005 MIU/L-ACNC: 1.47 UIU/ML (ref 0.34–5.6)
VIT B12 SERPL-MCNC: 650 PG/ML (ref 210–950)

## 2024-01-29 PROCEDURE — 82607 VITAMIN B-12: CPT | Performed by: HOSPITALIST

## 2024-01-29 PROCEDURE — 63600175 PHARM REV CODE 636 W HCPCS: Performed by: HOSPITALIST

## 2024-01-29 PROCEDURE — 25000003 PHARM REV CODE 250: Performed by: HOSPITALIST

## 2024-01-29 PROCEDURE — 36415 COLL VENOUS BLD VENIPUNCTURE: CPT | Performed by: HOSPITALIST

## 2024-01-29 PROCEDURE — 84439 ASSAY OF FREE THYROXINE: CPT | Performed by: HOSPITALIST

## 2024-01-29 PROCEDURE — 96372 THER/PROPH/DIAG INJ SC/IM: CPT | Performed by: HOSPITALIST

## 2024-01-29 PROCEDURE — 80048 BASIC METABOLIC PNL TOTAL CA: CPT | Performed by: HOSPITALIST

## 2024-01-29 PROCEDURE — 84443 ASSAY THYROID STIM HORMONE: CPT | Performed by: HOSPITALIST

## 2024-01-29 PROCEDURE — 21400001 HC TELEMETRY ROOM

## 2024-01-29 PROCEDURE — 83735 ASSAY OF MAGNESIUM: CPT | Performed by: HOSPITALIST

## 2024-01-29 PROCEDURE — 25500020 PHARM REV CODE 255: Performed by: HOSPITALIST

## 2024-01-29 PROCEDURE — 25000003 PHARM REV CODE 250: Performed by: INTERNAL MEDICINE

## 2024-01-29 RX ORDER — FAMOTIDINE 20 MG/1
20 TABLET, FILM COATED ORAL NIGHTLY
Status: DISCONTINUED | OUTPATIENT
Start: 2024-01-29 | End: 2024-01-30

## 2024-01-29 RX ORDER — TRAMADOL HYDROCHLORIDE 50 MG/1
50 TABLET ORAL EVERY 6 HOURS PRN
Status: DISCONTINUED | OUTPATIENT
Start: 2024-01-29 | End: 2024-02-01 | Stop reason: HOSPADM

## 2024-01-29 RX ORDER — ENOXAPARIN SODIUM 100 MG/ML
40 INJECTION SUBCUTANEOUS EVERY 12 HOURS
Status: DISCONTINUED | OUTPATIENT
Start: 2024-01-29 | End: 2024-02-01 | Stop reason: HOSPADM

## 2024-01-29 RX ADMIN — ATORVASTATIN CALCIUM 40 MG: 40 TABLET, FILM COATED ORAL at 08:01

## 2024-01-29 RX ADMIN — GABAPENTIN 300 MG: 300 CAPSULE ORAL at 09:01

## 2024-01-29 RX ADMIN — IOHEXOL 100 ML: 350 INJECTION, SOLUTION INTRAVENOUS at 04:01

## 2024-01-29 RX ADMIN — FAMOTIDINE 20 MG: 20 TABLET ORAL at 08:01

## 2024-01-29 RX ADMIN — ENOXAPARIN SODIUM 40 MG: 40 INJECTION SUBCUTANEOUS at 12:01

## 2024-01-29 RX ADMIN — ACETAMINOPHEN 650 MG: 325 TABLET ORAL at 09:01

## 2024-01-29 RX ADMIN — ENOXAPARIN SODIUM 40 MG: 40 INJECTION SUBCUTANEOUS at 08:01

## 2024-01-29 RX ADMIN — PANTOPRAZOLE SODIUM 40 MG: 40 TABLET, DELAYED RELEASE ORAL at 05:01

## 2024-01-29 RX ADMIN — EZETIMIBE 10 MG: 10 TABLET ORAL at 09:01

## 2024-01-29 RX ADMIN — GABAPENTIN 300 MG: 300 CAPSULE ORAL at 08:01

## 2024-01-29 NOTE — PLAN OF CARE
Problem: Adult Inpatient Plan of Care  Goal: Plan of Care Review  Outcome: Ongoing, Progressing  Goal: Patient-Specific Goal (Individualized)  Outcome: Ongoing, Progressing  Goal: Absence of Hospital-Acquired Illness or Injury  Outcome: Ongoing, Progressing  Goal: Optimal Comfort and Wellbeing  Outcome: Ongoing, Progressing  Goal: Readiness for Transition of Care  Outcome: Ongoing, Progressing     Problem: Fluid and Electrolyte Imbalance (Acute Kidney Injury/Impairment)  Goal: Fluid and Electrolyte Balance  Outcome: Ongoing, Progressing     Problem: Oral Intake Inadequate (Acute Kidney Injury/Impairment)  Goal: Optimal Nutrition Intake  Outcome: Ongoing, Progressing     Problem: Renal Function Impairment (Acute Kidney Injury/Impairment)  Goal: Effective Renal Function  Outcome: Ongoing, Progressing     Problem: Bariatric Environmental Safety  Goal: Safety Maintained with Care  Outcome: Ongoing, Progressing

## 2024-01-29 NOTE — PROGRESS NOTES
[facility] Medicine  Progress Note    Patient Name: Emily Bryant  MRN: [id number]  Patient Class: OP- Observation   Admission Date: 1/27/2024  Length of Stay: 0 days  Attending Physician: Lazara Devine MD  Primary Care Provider: Tim Burton MD        Subjective:     Principal Problem:Syncope        HPI:  Mrs. Bryant is a 53-years-old female who presented to the ED with complaints of syncope.  Collateral history obtained from family member present at bedside.  Patient states she was in her usual state of health, sitting at a table, playing cards with family, had consumed 3 alcoholic beverages.  Sudden onset of feeling unwell, dizzy sensation described as room spinning, nauseous, diaphoresis, family member stated she subsequently was not verbalizing for about 30 seconds, then slumped over, body became flaccid, episode lasted about 20 seconds before she became responsive, generalized weakness continued for about 1 minute before she returned to baseline.  She denies any preceding chest pain, shortness of breath, focal weakness of extremities, associated incontinence or seizure activity.  No similar episodes in the past.  States has been experiencing congestion over the last few days but denies any fever or chills.  No diarrhea, cystitis symptoms, lower extremity swelling or calf tenderness. Following this episode family member checked vitals, reported SBP in 90s and heart rate in 60s. Patient states potassium has been running low over the last few months, she is on hydrochlorothiazide for blood pressure control.  In the ED blood pressure borderline 94/54, heart rate in 70s, afebrile, on room air.  Labs with WBC 11, hemoglobin 12.8, sodium 135, potassium 2.9, magnesium 1.6, BUN/creatinine 17/1.5, lactic acid 2.5, glucose 155, troponin 3.5.  EKG sinus rhythm with first-degree with T-wave inversion V1/V2.  She received IV fluid, 40 mEq oral potassium and 2 g IV magnesium  supplementation in the ED.  Case discussed with ED provider who requested admission for high-risk syncope.  Plan of care discussed with patient and family member at bedside.    Overview/Hospital Course:  Patient was admitted with near syncope vs TIA-like symptoms.  showed a video of her with family when she seemed to have a brief period of dissociation to speech and awareness. She became weak with dizziness, nausea, spinning of the room, and slumping over the torso. No precipitating angina, visual changes, SOB, fever or chills. At the time of incident she was playing cards with family and had only small amount of alcohol intake, In ED she was found to be hypotensive, hypokalemic, hyponatremic, hypomagnesemia, elevated lactate 2.5, 1st degree AV block on bystolic with HR in 70s and O2 sats 985 on RA.   THere was no head CT on admit. EKG showed T wave inversion on V1/V2. Normal trponin x 2. Blood pressure Normal range though holding home medications. After discussion with patient and watching video, will hold discharge today. Plan for NST in am. ECHo reviewed. EF 55-60%. No valve disease. No diastolic dysfunction. Head and neck CTA. Plan for holter monitor on dc home and follow up cardiology. Check thyroid levels, HA1c, B12 and orthostatics.       Interval History: see hospital course     Review of Systems   Constitutional: Negative.    Respiratory: Negative.     Cardiovascular: Negative.    Gastrointestinal: Negative.    Genitourinary: Negative.    Musculoskeletal: Negative.    Neurological:  Positive for headaches.   Psychiatric/Behavioral: Negative.       Objective:     Vital Signs (Most Recent):  Temp: 97.7 °F (36.5 °C) (01/29/24 1144)  Pulse: (!) 58 (01/29/24 1144)  Resp: 16 (01/29/24 1144)  BP: 132/83 (01/29/24 1144)  SpO2: 99 % (01/29/24 1144) Vital Signs (24h Range):  Temp:  [97.5 °F (36.4 °C)-98.2 °F (36.8 °C)] 97.7 °F (36.5 °C)  Pulse:  [58-71] 58  Resp:  [16-18] 16  SpO2:  [96 %-99 %] 99 %  BP:  (116-135)/(57-89) 132/83     Weight: 116.3 kg (256 lb 6.4 oz)  Body mass index is 40.16 kg/m².    Intake/Output Summary (Last 24 hours) at 1/29/2024 1303  Last data filed at 1/29/2024 1144  Gross per 24 hour   Intake 640 ml   Output --   Net 640 ml         Physical Exam  Constitutional:       Appearance: Normal appearance.   Eyes:      Extraocular Movements: Extraocular movements intact.      Pupils: Pupils are equal, round, and reactive to light.   Cardiovascular:      Rate and Rhythm: Normal rate and regular rhythm.      Heart sounds: No murmur heard.  Pulmonary:      Effort: Pulmonary effort is normal.      Breath sounds: No wheezing or rales.   Abdominal:      General: Bowel sounds are normal. There is no distension.   Musculoskeletal:         General: Normal range of motion.      Cervical back: Normal range of motion and neck supple.   Skin:     General: Skin is warm and dry.      Capillary Refill: Capillary refill takes less than 2 seconds.   Neurological:      General: No focal deficit present.      Mental Status: She is alert and oriented to person, place, and time.   Psychiatric:         Mood and Affect: Mood normal.         Behavior: Behavior normal.             Significant Labs: All pertinent labs within the past 24 hours have been reviewed.  Recent Lab Results       None            Significant Imaging: I have reviewed all pertinent imaging results/findings within the past 24 hours.    Assessment/Plan:      * Syncope  Incomplete workup on admit  Head and neck CTA   NST pending  Continue tele monitoring  Bmp and mg  Orthostatics  Hold BP meds for SBP<100 and HR <60    Lactic acidosis  Likely due to hypotension       Neuropathy    Continue gabapentin     GERD (gastroesophageal reflux disease)    Pepcid qhs  Ok to bring home prilosec meds to take     Hypertension  Chronic, controlled. Latest blood pressure and vitals reviewed-     Temp:  [97.5 °F (36.4 °C)-98.2 °F (36.8 °C)]   Pulse:  [58-71]   Resp:   [16-18]   BP: (116-135)/(57-89)   SpO2:  [96 %-99 %] .   Home meds for hypertension were reviewed and noted below.   Hypertension Medications               hydroCHLOROthiazide (HYDRODIURIL) 25 MG tablet Take 25 mg by mouth once daily.    nebivoloL (BYSTOLIC) 10 MG Tab Take 10 mg by mouth every evening.    valsartan (DIOVAN) 80 MG tablet Take 80 mg by mouth once daily.            While in the hospital, will manage blood pressure as follows; Adjust home antihypertensive regimen as follows- hold home meds     Will utilize p.r.n. blood pressure medication only if patient's blood pressure greater than 180/110 and she develops symptoms such as worsening chest pain or shortness of breath.    Obesity  Body mass index is 40.16 kg/m². Morbid obesity complicates all aspects of disease management from diagnostic modalities to treatment. Weight loss encouraged and health benefits explained to patient.   Patient plans to follow up for weight loss medication        Hypokalemia  Patient has hypokalemia which is Acute and currently uncontrolled. Most recent potassium levels reviewed-   Lab Results   Component Value Date    K 3.9 01/28/2024   . Will continue potassium replacement per protocol and recheck repeat levels after replacement completed.     Hold HCTZ    JO (acute kidney injury)  Patient with acute kidney injury/acute renal failure likely due to pre-renal azotemia due to dehydration JO is currently improving. Baseline creatinine  1.0  - Labs reviewed- Renal function/electrolytes with Estimated Creatinine Clearance: 87.7 mL/min (based on SCr of 1 mg/dL). according to latest data. Monitor urine output and serial BMP and adjust therapy as needed. Avoid nephrotoxins and renally dose meds for GFR listed above.      VTE Risk Mitigation (From admission, onward)           Ordered     enoxaparin injection 40 mg  Every 12 hours         01/29/24 1010     Place sequential compression device  Until discontinued         01/28/24 0026      IP VTE HIGH RISK PATIENT  Once         01/28/24 0026                    Discharge Planning   KRISTIE: 1/29/2024     Code Status: Full Code   Is the patient medically ready for discharge?:     Reason for patient still in hospital (select all that apply): Patient trending condition, Treatment, and Imaging  Discharge Plan A: Home with family                  Lazara Devine MD  Department of Hospital Medicine   [facility]

## 2024-01-29 NOTE — ASSESSMENT & PLAN NOTE
Incomplete workup on admit  Head and neck CTA   NST pending  Continue tele monitoring  Bmp and mg  Orthostatics  Hold BP meds for SBP<100 and HR <60

## 2024-01-29 NOTE — ASSESSMENT & PLAN NOTE
Patient has hypokalemia which is Acute and currently uncontrolled. Most recent potassium levels reviewed-   Lab Results   Component Value Date    K 3.9 01/28/2024   . Will continue potassium replacement per protocol and recheck repeat levels after replacement completed.     Hold HCTZ

## 2024-01-29 NOTE — ASSESSMENT & PLAN NOTE
Chronic, controlled. Latest blood pressure and vitals reviewed-     Temp:  [97.5 °F (36.4 °C)-98.2 °F (36.8 °C)]   Pulse:  [58-71]   Resp:  [16-18]   BP: (116-135)/(57-89)   SpO2:  [96 %-99 %] .   Home meds for hypertension were reviewed and noted below.   Hypertension Medications               hydroCHLOROthiazide (HYDRODIURIL) 25 MG tablet Take 25 mg by mouth once daily.    nebivoloL (BYSTOLIC) 10 MG Tab Take 10 mg by mouth every evening.    valsartan (DIOVAN) 80 MG tablet Take 80 mg by mouth once daily.            While in the hospital, will manage blood pressure as follows; Adjust home antihypertensive regimen as follows- hold home meds     Will utilize p.r.n. blood pressure medication only if patient's blood pressure greater than 180/110 and she develops symptoms such as worsening chest pain or shortness of breath.

## 2024-01-29 NOTE — SUBJECTIVE & OBJECTIVE
Interval History: see hospital course     Review of Systems   Constitutional: Negative.    Respiratory: Negative.     Cardiovascular: Negative.    Gastrointestinal: Negative.    Genitourinary: Negative.    Musculoskeletal: Negative.    Neurological:  Positive for headaches.   Psychiatric/Behavioral: Negative.       Objective:     Vital Signs (Most Recent):  Temp: 97.7 °F (36.5 °C) (01/29/24 1144)  Pulse: (!) 58 (01/29/24 1144)  Resp: 16 (01/29/24 1144)  BP: 132/83 (01/29/24 1144)  SpO2: 99 % (01/29/24 1144) Vital Signs (24h Range):  Temp:  [97.5 °F (36.4 °C)-98.2 °F (36.8 °C)] 97.7 °F (36.5 °C)  Pulse:  [58-71] 58  Resp:  [16-18] 16  SpO2:  [96 %-99 %] 99 %  BP: (116-135)/(57-89) 132/83     Weight: 116.3 kg (256 lb 6.4 oz)  Body mass index is 40.16 kg/m².    Intake/Output Summary (Last 24 hours) at 1/29/2024 1303  Last data filed at 1/29/2024 1144  Gross per 24 hour   Intake 640 ml   Output --   Net 640 ml         Physical Exam  Constitutional:       Appearance: Normal appearance.   Eyes:      Extraocular Movements: Extraocular movements intact.      Pupils: Pupils are equal, round, and reactive to light.   Cardiovascular:      Rate and Rhythm: Normal rate and regular rhythm.      Heart sounds: No murmur heard.  Pulmonary:      Effort: Pulmonary effort is normal.      Breath sounds: No wheezing or rales.   Abdominal:      General: Bowel sounds are normal. There is no distension.   Musculoskeletal:         General: Normal range of motion.      Cervical back: Normal range of motion and neck supple.   Skin:     General: Skin is warm and dry.      Capillary Refill: Capillary refill takes less than 2 seconds.   Neurological:      General: No focal deficit present.      Mental Status: She is alert and oriented to person, place, and time.   Psychiatric:         Mood and Affect: Mood normal.         Behavior: Behavior normal.             Significant Labs: All pertinent labs within the past 24 hours have been  reviewed.  Recent Lab Results       None            Significant Imaging: I have reviewed all pertinent imaging results/findings within the past 24 hours.

## 2024-01-29 NOTE — PROGRESS NOTES
Pharmacist Renal Dose Adjustment Note    Emily Bryant is a 53 y.o. female being treated with Famotidine.     Patient Data:    Vital Signs (Most Recent):  Temp: 98.2 °F (36.8 °C) (01/28/24 1930)  Pulse: 70 (01/28/24 1930)  Resp: 18 (01/28/24 1510)  BP: 122/75 (01/28/24 1930)  SpO2: 99 % (01/28/24 1930) Vital Signs (72h Range):  Temp:  [97.2 °F (36.2 °C)-98.2 °F (36.8 °C)]   Pulse:  [60-86]   Resp:  [17-20]   BP: ()/(54-89)   SpO2:  [96 %-100 %]      Recent Labs   Lab 01/27/24 2239 01/28/24  0412   CREATININE 1.5* 1.0     Serum creatinine: 1 mg/dL 01/28/24 0412  Estimated creatinine clearance: 87.7 mL/min    Famotidine 20 mg once daily will be changed to Famotidine 40 mg once daily per Renal Dose Adjustment protocol.    Pharmacist's Name: Leeann Martinez  Pharmacist's Extension: [phone]

## 2024-01-29 NOTE — ASSESSMENT & PLAN NOTE
Patient with acute kidney injury/acute renal failure likely due to pre-renal azotemia due to dehydration JO is currently improving. Baseline creatinine  1.0  - Labs reviewed- Renal function/electrolytes with Estimated Creatinine Clearance: 87.7 mL/min (based on SCr of 1 mg/dL). according to latest data. Monitor urine output and serial BMP and adjust therapy as needed. Avoid nephrotoxins and renally dose meds for GFR listed above.

## 2024-01-29 NOTE — PLAN OF CARE
Unable to schedule pcp hospital follow up- left message at the clinic    Pt is clear for dc from CM after seen by hospital medicine       01/29/24 1126   Final Note   Assessment Type Final Discharge Note   Anticipated Discharge Disposition Home   Hospital Resources/Appts/Education Provided Appointment suggestion unavailable

## 2024-01-29 NOTE — HOSPITAL COURSE
Patient was admitted with near syncope vs TIA-like symptoms.  showed a video of her with family when she seemed to have a brief period of dissociation to speech and awareness. She became weak with dizziness, nausea, spinning of the room, and slumping over the torso. No precipitating angina, visual changes, SOB, fever or chills. At the time of incident she was playing cards with family and had only small amount of alcohol intake, In ED she was found to be hypotensive, hypokalemic, hyponatremic, hypomagnesemia, elevated lactate 2.5, 1st degree AV block on bystolic with HR in 70s and O2 sats 985 on RA.   THere was no head CT on admit. EKG showed T wave inversion on V1/V2. Normal trponin x 2. Blood pressure Normal range though holding home medications. After discussion with patient and watching video, will hold discharge today. Plan for NST in am. ECHo reviewed. EF 55-60%. No valve disease. No diastolic dysfunction. Head and neck CTA. Plan for holter monitor on dc home and follow up cardiology. Check thyroid levels, HA1c, B12 and orthostatics.   No large vessel disease on head neck CTA. Noted abnormal NST today. Patient describes intermittent mild chest discomfort over last several months. Cardiology consulted and would like neurology to see before C takes place.   Neurology completed eval with brain MRI and no acute findings. Cleared for angiogram. [facility]  britney labs prior to this admission that were concern for adrenal insufficiency. Cortisol <3. We did the cosyntropin test and she did not respond appropriately. 60 min cortisol level was 11, 30 min was 8.  She will receive 100mg hydrocortisone IV prior to angiogram as instructed by endocrinologist.     2/1/2024  Ms Bryant has no new complaints and has had a negative cardiac cath per Dr Castellanos.   No indication for further work per Dr Castellanos or Dr Jacques Martinez  Pt strongly desires to go home.  ROS see above otherwise negative 10/10  systems  PE : in no distress HEENT CHATO EOM intact moist mucus membranes Neck supple no use of accessory muscles Lungs no crackles wheezes or rhonchi Heart S 1 S 2 RRR no murmur Abdomen BS+ nontender Ext without CC or E pulses 1-2+ Skin no acute finding Neuro no acute sensory or motor deficit

## 2024-01-29 NOTE — ASSESSMENT & PLAN NOTE
Body mass index is 40.16 kg/m². Morbid obesity complicates all aspects of disease management from diagnostic modalities to treatment. Weight loss encouraged and health benefits explained to patient.   Patient plans to follow up for weight loss medication

## 2024-01-30 ENCOUNTER — CLINICAL SUPPORT (OUTPATIENT)
Dept: CARDIOLOGY | Facility: HOSPITAL | Age: 54
DRG: 287 | End: 2024-01-30
Attending: HOSPITALIST
Payer: OTHER GOVERNMENT

## 2024-01-30 LAB
CV PHARM DOSE: 0.4 MG
CV STRESS BASE HR: 56 BPM
DIASTOLIC BLOOD PRESSURE: 73 MMHG
OHS CV CPX 1 MINUTE RECOVERY HEART RATE: 81 BPM
OHS CV CPX 85 PERCENT MAX PREDICTED HEART RATE MALE: 144
OHS CV CPX MAX PREDICTED HEART RATE: 169
OHS CV CPX PATIENT IS FEMALE: 1
OHS CV CPX PATIENT IS MALE: 0
OHS CV CPX PEAK DIASTOLIC BLOOD PRESSURE: 81 MMHG
OHS CV CPX PEAK HEAR RATE: 104 BPM
OHS CV CPX PEAK RATE PRESSURE PRODUCT: NORMAL
OHS CV CPX PEAK SYSTOLIC BLOOD PRESSURE: 131 MMHG
OHS CV CPX PERCENT MAX PREDICTED HEART RATE ACHIEVED: 65
OHS CV CPX RATE PRESSURE PRODUCT PRESENTING: 8176
SYSTOLIC BLOOD PRESSURE: 146 MMHG

## 2024-01-30 PROCEDURE — 93017 CV STRESS TEST TRACING ONLY: CPT

## 2024-01-30 PROCEDURE — 99223 1ST HOSP IP/OBS HIGH 75: CPT | Mod: ,,, | Performed by: INTERNAL MEDICINE

## 2024-01-30 PROCEDURE — 25000003 PHARM REV CODE 250: Performed by: INTERNAL MEDICINE

## 2024-01-30 PROCEDURE — 93018 CV STRESS TEST I&R ONLY: CPT | Mod: ,,, | Performed by: STUDENT IN AN ORGANIZED HEALTH CARE EDUCATION/TRAINING PROGRAM

## 2024-01-30 PROCEDURE — 63600175 PHARM REV CODE 636 W HCPCS: Performed by: HOSPITALIST

## 2024-01-30 PROCEDURE — 93016 CV STRESS TEST SUPVJ ONLY: CPT | Mod: ,,, | Performed by: STUDENT IN AN ORGANIZED HEALTH CARE EDUCATION/TRAINING PROGRAM

## 2024-01-30 PROCEDURE — 25000003 PHARM REV CODE 250: Performed by: HOSPITALIST

## 2024-01-30 PROCEDURE — 21400001 HC TELEMETRY ROOM

## 2024-01-30 RX ORDER — PANTOPRAZOLE SODIUM 40 MG/1
40 TABLET, DELAYED RELEASE ORAL ONCE
Status: COMPLETED | OUTPATIENT
Start: 2024-01-30 | End: 2024-01-30

## 2024-01-30 RX ORDER — REGADENOSON 0.08 MG/ML
0.4 INJECTION, SOLUTION INTRAVENOUS ONCE
Status: COMPLETED | OUTPATIENT
Start: 2024-01-30 | End: 2024-01-30

## 2024-01-30 RX ORDER — ASPIRIN 81 MG/1
81 TABLET ORAL DAILY
Status: DISCONTINUED | OUTPATIENT
Start: 2024-01-30 | End: 2024-02-01 | Stop reason: HOSPADM

## 2024-01-30 RX ORDER — PANTOPRAZOLE SODIUM 40 MG/1
40 TABLET, DELAYED RELEASE ORAL
Status: DISCONTINUED | OUTPATIENT
Start: 2024-01-31 | End: 2024-02-01 | Stop reason: HOSPADM

## 2024-01-30 RX ORDER — CALCIUM CARBONATE 200(500)MG
500 TABLET,CHEWABLE ORAL 3 TIMES DAILY PRN
Status: DISCONTINUED | OUTPATIENT
Start: 2024-01-30 | End: 2024-02-01 | Stop reason: HOSPADM

## 2024-01-30 RX ADMIN — ENOXAPARIN SODIUM 40 MG: 40 INJECTION SUBCUTANEOUS at 08:01

## 2024-01-30 RX ADMIN — GABAPENTIN 300 MG: 300 CAPSULE ORAL at 08:01

## 2024-01-30 RX ADMIN — CALCIUM CARBONATE (ANTACID) CHEW TAB 500 MG 500 MG: 500 CHEW TAB at 03:01

## 2024-01-30 RX ADMIN — PANTOPRAZOLE SODIUM 40 MG: 40 TABLET, DELAYED RELEASE ORAL at 03:01

## 2024-01-30 RX ADMIN — ATORVASTATIN CALCIUM 40 MG: 40 TABLET, FILM COATED ORAL at 08:01

## 2024-01-30 RX ADMIN — ASPIRIN 81 MG: 81 TABLET, COATED ORAL at 03:01

## 2024-01-30 RX ADMIN — REGADENOSON 0.4 MG: 0.08 INJECTION, SOLUTION INTRAVENOUS at 10:01

## 2024-01-30 RX ADMIN — EZETIMIBE 10 MG: 10 TABLET ORAL at 08:01

## 2024-01-30 NOTE — NURSING NOTE
Lexiscan portion of Stress Test completed without complications. Patient verbalized understanding of pre-post test instructions. Test supervision and discharge from lab per Deborah Sanders NP.

## 2024-01-30 NOTE — ASSESSMENT & PLAN NOTE
Incomplete workup on admit  Head and neck CTA - no LVO  ?TIA- neuro consulted   Aspirin daily   Holding beta blocker as she was in 1st degree AVblock on admit  NST- abnormal - cardiology consulted  Continue tele monitoring  Bmp and mg  Orthostatics  Hold BP meds for SBP<100 and HR <60

## 2024-01-30 NOTE — PLAN OF CARE
Pt with positive stress test. Neuro has been consulted for clearance to do angio. CM to follow       01/30/24 1378   Discharge Reassessment   Assessment Type Discharge Planning Reassessment   Did the patient's condition or plan change since previous assessment? Yes   Discharge Plan A Home with family   Discharge Plan B Home   Why the patient remains in the hospital Requires continued medical care

## 2024-01-30 NOTE — SUBJECTIVE & OBJECTIVE
Interval History: see hospital course     Review of Systems   Constitutional: Negative.    Respiratory: Negative.     Cardiovascular: Negative.    Gastrointestinal: Negative.    Genitourinary: Negative.    Musculoskeletal: Negative.    Neurological:  Positive for headaches.   Psychiatric/Behavioral: Negative.       Objective:     Vital Signs (Most Recent):  Temp: 97.6 °F (36.4 °C) (01/30/24 1115)  Pulse: 64 (01/30/24 1115)  Resp: 16 (01/30/24 1115)  BP: 134/82 (01/30/24 1115)  SpO2: 99 % (01/30/24 1115) Vital Signs (24h Range):  Temp:  [97.6 °F (36.4 °C)-98 °F (36.7 °C)] 97.6 °F (36.4 °C)  Pulse:  [56-66] 64  Resp:  [16-17] 16  SpO2:  [96 %-99 %] 99 %  BP: (119-146)/(59-82) 134/82     Weight: 116.3 kg (256 lb 6.4 oz)  Body mass index is 40.16 kg/m².    Intake/Output Summary (Last 24 hours) at 1/30/2024 1412  Last data filed at 1/30/2024 1259  Gross per 24 hour   Intake 1320 ml   Output --   Net 1320 ml           Physical Exam  Constitutional:       Appearance: Normal appearance.   Eyes:      Extraocular Movements: Extraocular movements intact.      Pupils: Pupils are equal, round, and reactive to light.   Cardiovascular:      Rate and Rhythm: Normal rate and regular rhythm.      Heart sounds: No murmur heard.  Pulmonary:      Effort: Pulmonary effort is normal.      Breath sounds: No wheezing or rales.   Abdominal:      General: Bowel sounds are normal. There is no distension.   Musculoskeletal:         General: Normal range of motion.      Cervical back: Normal range of motion and neck supple.   Skin:     General: Skin is warm and dry.      Capillary Refill: Capillary refill takes less than 2 seconds.   Neurological:      General: No focal deficit present.      Mental Status: She is alert and oriented to person, place, and time.   Psychiatric:         Mood and Affect: Mood normal.         Behavior: Behavior normal.             Significant Labs: All pertinent labs within the past 24 hours have been reviewed.  Recent  Lab Results       None            Significant Imaging: I have reviewed all pertinent imaging results/findings within the past 24 hours.

## 2024-01-30 NOTE — ASSESSMENT & PLAN NOTE
Patient has hypokalemia which is Acute and currently uncontrolled. Most recent potassium levels reviewed-   Lab Results   Component Value Date    K 3.9 01/29/2024   . Will continue potassium replacement per protocol and recheck repeat levels after replacement completed.     Hold HCTZ

## 2024-01-30 NOTE — PROGRESS NOTES
[facility] Medicine  Progress Note    Patient Name: Emily Bryant  MRN: [id number]  Patient Class: IP- Inpatient   Admission Date: 1/27/2024  Length of Stay: 1 days  Attending Physician: Lazara Devine MD  Primary Care Provider: Tim Burton MD        Subjective:     Principal Problem:Syncope        HPI:  Mrs. Bryant is a 53-years-old female who presented to the ED with complaints of syncope.  Collateral history obtained from family member present at bedside.  Patient states she was in her usual state of health, sitting at a table, playing cards with family, had consumed 3 alcoholic beverages.  Sudden onset of feeling unwell, dizzy sensation described as room spinning, nauseous, diaphoresis, family member stated she subsequently was not verbalizing for about 30 seconds, then slumped over, body became flaccid, episode lasted about 20 seconds before she became responsive, generalized weakness continued for about 1 minute before she returned to baseline.  She denies any preceding chest pain, shortness of breath, focal weakness of extremities, associated incontinence or seizure activity.  No similar episodes in the past.  States has been experiencing congestion over the last few days but denies any fever or chills.  No diarrhea, cystitis symptoms, lower extremity swelling or calf tenderness. Following this episode family member checked vitals, reported SBP in 90s and heart rate in 60s. Patient states potassium has been running low over the last few months, she is on hydrochlorothiazide for blood pressure control.  In the ED blood pressure borderline 94/54, heart rate in 70s, afebrile, on room air.  Labs with WBC 11, hemoglobin 12.8, sodium 135, potassium 2.9, magnesium 1.6, BUN/creatinine 17/1.5, lactic acid 2.5, glucose 155, troponin 3.5.  EKG sinus rhythm with first-degree with T-wave inversion V1/V2.  She received IV fluid, 40 mEq oral potassium and 2 g IV magnesium  supplementation in the ED.  Case discussed with ED provider who requested admission for high-risk syncope.  Plan of care discussed with patient and family member at bedside.    Overview/Hospital Course:  Patient was admitted with near syncope vs TIA-like symptoms.  showed a video of her with family when she seemed to have a brief period of dissociation to speech and awareness. She became weak with dizziness, nausea, spinning of the room, and slumping over the torso. No precipitating angina, visual changes, SOB, fever or chills. At the time of incident she was playing cards with family and had only small amount of alcohol intake, In ED she was found to be hypotensive, hypokalemic, hyponatremic, hypomagnesemia, elevated lactate 2.5, 1st degree AV block on bystolic with HR in 70s and O2 sats 985 on RA.   THere was no head CT on admit. EKG showed T wave inversion on V1/V2. Normal trponin x 2. Blood pressure Normal range though holding home medications. After discussion with patient and watching video, will hold discharge today. Plan for NST in am. ECHo reviewed. EF 55-60%. No valve disease. No diastolic dysfunction. Head and neck CTA. Plan for holter monitor on dc home and follow up cardiology. Check thyroid levels, HA1c, B12 and orthostatics.   No large vessel disease on head neck CTA. Noted abnormal NST today. Patient describes intermittent mild chest discomfort over last several months. Cardiology consulted and would like neurology to see before C takes place.       Interval History: see hospital course     Review of Systems   Constitutional: Negative.    Respiratory: Negative.     Cardiovascular: Negative.    Gastrointestinal: Negative.    Genitourinary: Negative.    Musculoskeletal: Negative.    Neurological:  Positive for headaches.   Psychiatric/Behavioral: Negative.       Objective:     Vital Signs (Most Recent):  Temp: 97.6 °F (36.4 °C) (01/30/24 1115)  Pulse: 64 (01/30/24 1115)  Resp: 16 (01/30/24  1115)  BP: 134/82 (01/30/24 1115)  SpO2: 99 % (01/30/24 1115) Vital Signs (24h Range):  Temp:  [97.6 °F (36.4 °C)-98 °F (36.7 °C)] 97.6 °F (36.4 °C)  Pulse:  [56-66] 64  Resp:  [16-17] 16  SpO2:  [96 %-99 %] 99 %  BP: (119-146)/(59-82) 134/82     Weight: 116.3 kg (256 lb 6.4 oz)  Body mass index is 40.16 kg/m².    Intake/Output Summary (Last 24 hours) at 1/30/2024 1412  Last data filed at 1/30/2024 1259  Gross per 24 hour   Intake 1320 ml   Output --   Net 1320 ml           Physical Exam  Constitutional:       Appearance: Normal appearance.   Eyes:      Extraocular Movements: Extraocular movements intact.      Pupils: Pupils are equal, round, and reactive to light.   Cardiovascular:      Rate and Rhythm: Normal rate and regular rhythm.      Heart sounds: No murmur heard.  Pulmonary:      Effort: Pulmonary effort is normal.      Breath sounds: No wheezing or rales.   Abdominal:      General: Bowel sounds are normal. There is no distension.   Musculoskeletal:         General: Normal range of motion.      Cervical back: Normal range of motion and neck supple.   Skin:     General: Skin is warm and dry.      Capillary Refill: Capillary refill takes less than 2 seconds.   Neurological:      General: No focal deficit present.      Mental Status: She is alert and oriented to person, place, and time.   Psychiatric:         Mood and Affect: Mood normal.         Behavior: Behavior normal.             Significant Labs: All pertinent labs within the past 24 hours have been reviewed.  Recent Lab Results       None            Significant Imaging: I have reviewed all pertinent imaging results/findings within the past 24 hours.    Assessment/Plan:      * Syncope  Incomplete workup on admit  Head and neck CTA - no LVO  ?TIA- neuro consulted   Aspirin daily   Holding beta blocker as she was in 1st degree AVblock on admit  NST- abnormal - cardiology consulted  Continue tele monitoring  Bmp and mg  Orthostatics  Hold BP meds for SBP<100  and HR <60    Lactic acidosis  Likely due to hypotension       Neuropathy    Continue gabapentin     GERD (gastroesophageal reflux disease)    Pepcid qhs  Ok to bring home prilosec meds to take     Hypertension  Chronic, controlled. Latest blood pressure and vitals reviewed-     Temp:  [97.5 °F (36.4 °C)-98.2 °F (36.8 °C)]   Pulse:  [58-71]   Resp:  [16-18]   BP: (116-135)/(57-89)   SpO2:  [96 %-99 %] .   Home meds for hypertension were reviewed and noted below.   Hypertension Medications               hydroCHLOROthiazide (HYDRODIURIL) 25 MG tablet Take 25 mg by mouth once daily.    nebivoloL (BYSTOLIC) 10 MG Tab Take 10 mg by mouth every evening.    valsartan (DIOVAN) 80 MG tablet Take 80 mg by mouth once daily.            While in the hospital, will manage blood pressure as follows; Adjust home antihypertensive regimen as follows- hold home meds     Will utilize p.r.n. blood pressure medication only if patient's blood pressure greater than 180/110 and she develops symptoms such as worsening chest pain or shortness of breath.    Obesity  Body mass index is 40.16 kg/m². Morbid obesity complicates all aspects of disease management from diagnostic modalities to treatment. Weight loss encouraged and health benefits explained to patient.   Patient plans to follow up for weight loss medication        Hypokalemia  Patient has hypokalemia which is Acute and currently uncontrolled. Most recent potassium levels reviewed-   Lab Results   Component Value Date    K 3.9 01/29/2024   . Will continue potassium replacement per protocol and recheck repeat levels after replacement completed.     Hold HCTZ    JO (acute kidney injury)  Patient with acute kidney injury/acute renal failure likely due to pre-renal azotemia due to dehydration JO is currently improving. Baseline creatinine  1.0  - Labs reviewed- Renal function/electrolytes with Estimated Creatinine Clearance: 109.7 mL/min (based on SCr of 0.8 mg/dL). according to latest  data. Monitor urine output and serial BMP and adjust therapy as needed. Avoid nephrotoxins and renally dose meds for GFR listed above.      VTE Risk Mitigation (From admission, onward)           Ordered     enoxaparin injection 40 mg  Every 12 hours         01/29/24 1010     Place sequential compression device  Until discontinued         01/28/24 0026     IP VTE HIGH RISK PATIENT  Once         01/28/24 0026                    Discharge Planning   KRISTIE: 1/30/2024     Code Status: Full Code   Is the patient medically ready for discharge?:     Reason for patient still in hospital (select all that apply): Laboratory test, Treatment, and Consult recommendations  Discharge Plan A: Home with family                  Lazara Devine MD  Department of Hospital Medicine   [facility]

## 2024-01-30 NOTE — ASSESSMENT & PLAN NOTE
Patient with acute kidney injury/acute renal failure likely due to pre-renal azotemia due to dehydration JO is currently improving. Baseline creatinine  1.0  - Labs reviewed- Renal function/electrolytes with Estimated Creatinine Clearance: 109.7 mL/min (based on SCr of 0.8 mg/dL). according to latest data. Monitor urine output and serial BMP and adjust therapy as needed. Avoid nephrotoxins and renally dose meds for GFR listed above.

## 2024-01-30 NOTE — CONSULTS
"[facility] [place]  Department of Cardiology  Consult Note      PATIENT NAME: Emily Bryant  MRN: [id number]  TODAY'S DATE: 01/30/2024  ADMIT DATE: 1/27/2024                          CONSULT REQUESTED BY: Lazara Devine MD    SUBJECTIVE     PRINCIPAL PROBLEM: Syncope      REASON FOR CONSULT:  New onset syncope, hypotension       HPI:  Pt is a 53 year old female with PMH of HTN and HLD who presented to the ER a few nights ago with new onset syncope. She states that she was playing a game with her family at the table when she just "passed out." Her  is a retired  medic and was able to guide her head down to avoid it hitting the table. They have a camera in their dining room and were able to catch the whole episode on video. Right before she passed out, she was seen trying to speak but was unable to get any words out. CT in ER was negative.    Per HPI from hospital medicine:  Mrs. Bryant is a 53-years-old female who presented to the ED with complaints of syncope.  Collateral history obtained from family member present at bedside.  Patient states she was in her usual state of health, sitting at a table, playing cards with family, had consumed 3 alcoholic beverages.  Sudden onset of feeling unwell, dizzy sensation described as room spinning, nauseous, diaphoresis, family member stated she subsequently was not verbalizing for about 30 seconds, then slumped over, body became flaccid, episode lasted about 20 seconds before she became responsive, generalized weakness continued for about 1 minute before she returned to baseline.  She denies any preceding chest pain, shortness of breath, focal weakness of extremities, associated incontinence or seizure activity.  No similar episodes in the past.  States has been experiencing congestion over the last few days but denies any fever or chills.  No diarrhea, cystitis symptoms, lower extremity swelling or calf tenderness. Following this episode family " member checked vitals, reported SBP in 90s and heart rate in 60s. Patient states potassium has been running low over the last few months, she is on hydrochlorothiazide for blood pressure control.  In the ED blood pressure borderline 94/54, heart rate in 70s, afebrile, on room air.  Labs with WBC 11, hemoglobin 12.8, sodium 135, potassium 2.9, magnesium 1.6, BUN/creatinine 17/1.5, lactic acid 2.5, glucose 155, troponin 3.5.  EKG sinus rhythm with first-degree with T-wave inversion V1/V2.  She received IV fluid, 40 mEq oral potassium and 2 g IV magnesium supplementation in the ED.  Case discussed with ED provider who requested admission for high-risk syncope.  Plan of care discussed with patient and family member at bedside.       Review of patient's allergies indicates:  No Known Allergies    Past Medical History:   Diagnosis Date    Heartburn      Past Surgical History:   Procedure Laterality Date    ANTERIOR CRUCIATE LIGAMENT REPAIR      AUGMENTATION OF BREAST      BREAST SURGERY       SECTION      CHOLECYSTECTOMY      FOOT SURGERY      TONSILLECTOMY       Social History     Tobacco Use    Smoking status: Former     Types: Cigarettes    Smokeless tobacco: Former    Tobacco comments:     Ex smoker 12 years   Substance Use Topics    Alcohol use: Yes     Comment: occassionally        REVIEW OF SYSTEMS    As mentioned in HPI    OBJECTIVE     VITAL SIGNS (Most Recent)  Temp: 97.6 °F (36.4 °C) (24 1115)  Pulse: 64 (24 111)  Resp: 16 (24 111)  BP: 134/82 (24 1115)  SpO2: 99 % (24)    VENTILATION STATUS  Resp: 16 (24)  SpO2: 99 % (24 1115)           I & O (Last 24H):  Intake/Output Summary (Last 24 hours) at 2024 1425  Last data filed at 2024 1259  Gross per 24 hour   Intake 1320 ml   Output --   Net 1320 ml       WEIGHTS  Wt Readings from Last 3 Encounters:   24 1115 116.3 kg (256 lb 6.4 oz)   24 2226 113.4 kg (250 lb)   24 0940  113.4 kg (250 lb)   10/03/18 0907 90.7 kg (200 lb)       PHYSICAL EXAM    CONSTITUTIONAL: NAD  HEENT: Normocephalic. No pallor  NECK: no JVD  LUNGS: CTA b/l  HEART: regular rate and rhythm, S1, S2 normal, no murmur   ABDOMEN: soft, non-tender, bowel sounds normal  EXTREMITIES: No edema  SKIN: No rash  NEURO: AAO X 3  PSYCH: normal affect      HOME MEDICATIONS:  No current facility-administered medications on file prior to encounter.     Current Outpatient Medications on File Prior to Encounter   Medication Sig Dispense Refill    atorvastatin (LIPITOR) 40 MG tablet Take 40 mg by mouth every morning.      ezetimibe (ZETIA) 10 mg tablet Take 10 mg by mouth once daily.      famotidine (PEPCID) 20 MG tablet Take 20 mg by mouth every evening.      gabapentin (NEURONTIN) 300 MG capsule Take 300 mg by mouth 2 (two) times a day. Takes 1500 and 2100      hydroCHLOROthiazide (HYDRODIURIL) 25 MG tablet Take 25 mg by mouth once daily.      nebivoloL (BYSTOLIC) 10 MG Tab Take 10 mg by mouth every evening.      omeprazole (PRILOSEC) 20 MG capsule Take 20 mg by mouth 2 (two) times a day.      valsartan (DIOVAN) 80 MG tablet Take 80 mg by mouth once daily.      vitamin D (VITAMIN D3) 1000 units Tab Take 1,000 Units by mouth once daily.      traMADol (ULTRAM) 50 mg tablet Take 1 tablet (50 mg total) by mouth every 6 to 8 hours as needed for Pain (Rx# 2083). 28 tablet 0       SCHEDULED MEDS:   aspirin  81 mg Oral Daily    atorvastatin  40 mg Oral QHS    enoxparin  40 mg Subcutaneous Q12H (prophylaxis, 0900/2100)    ezetimibe  10 mg Oral Daily    famotidine  20 mg Oral QHS    gabapentin  300 mg Oral BID       CONTINUOUS INFUSIONS:    PRN MEDS:acetaminophen, dextrose 50%, dextrose 50%, glucagon (human recombinant), glucose, glucose, magnesium oxide, magnesium oxide, naloxone, ondansetron, potassium bicarbonate, potassium bicarbonate, potassium bicarbonate, potassium, sodium phosphates, potassium, sodium phosphates, potassium, sodium  "phosphates, sodium chloride 0.9%, traMADoL    LABS AND DIAGNOSTICS     CBC LAST 3 DAYS  Recent Labs   Lab 01/27/24 2239 01/28/24 0412   WBC 11.42 8.35   RBC 4.54 4.15   HGB 12.8 11.8*   HCT 39.0 35.2*   MCV 86 85   MCH 28.2 28.4   MCHC 32.8 33.5   RDW 13.2 13.2    275   MPV 10.1 10.4   GRAN 50.1  5.7  --    LYMPH 41.2  4.7  --    MONO 6.7  0.8  --    BASO 0.10  --    NRBC 0  --        COAGULATION LAST 3 DAYS  No results for input(s): "LABPT", "INR", "APTT" in the last 168 hours.    CHEMISTRY LAST 3 DAYS  Recent Labs   Lab 01/27/24 2239 01/28/24 0412 01/29/24  1247   * 137 137   K 2.9* 3.9 3.9   CL 99 102 100   CO2 25 25 28   ANIONGAP 11 10 9   BUN 17 15 12   CREATININE 1.5* 1.0 0.8   * 108 109   CALCIUM 9.3 9.0 9.3   MG 1.6 2.3 2.0   ALBUMIN 4.0  --   --    PROT 7.0  --   --    ALKPHOS 81  --   --    ALT 17  --   --    AST 16  --   --    BILITOT 0.4  --   --        CARDIAC PROFILE LAST 3 DAYS  Recent Labs   Lab 01/27/24 2239 01/28/24  0000   TROPONINIHS 3.5 3.2       ENDOCRINE LAST 3 DAYS  Recent Labs   Lab 01/29/24  1247   TSH 1.466       LAST ARTERIAL BLOOD GAS  ABG  No results for input(s): "PH", "PO2", "PCO2", "HCO3", "BE" in the last 168 hours.    LAST 7 DAYS MICROBIOLOGY   Microbiology Results (last 7 days)       ** No results found for the last 168 hours. **            MOST RECENT IMAGING  NM Myocardial Perfusion Spect Multi Pharmacologic  Myocardial Perfusion Imaging with SPECT Gated acquisition and Ejection Fraction two day protocol    CLINICAL INFORMATION:  Chest pain.    PROCEDURE:    Lexiscan is utilized as a pharmacologic stress agent. At peak stress, 24.0 millicuries of technetium Myoview were administered intravenously.  The rest portion of the study is accomplished on the same day, utilizing 24.4 millicuries of technetium Myoview intravenously.    FINDINGS:    There is diminished tracer accumulation within the ventricular apex and adjacent inferolateral/lateral wall, more " pronounced on the stress portion of the examination. This is concerning for pharmacologically induced reversibility. The distribution of activity elsewhere appears unremarkable. The chamber size is within normal limits.  There are no wall motion abnormalities and the estimated ejection fraction is 73%.    IMPRESSION:    ABNORMAL STUDY DEMONSTRATING PARTIALLY REVERSIBLE DEFECT INVOLVING THE APEX AND ADJACENT INFEROLATERAL/LATERAL WALL.    ESTIMATED EJECTION FRACTION OF 73%.    Electronically signed by:  Randolph Hernadez MD  01/30/2024 11:31 AM CST Workstation: [id number]      ECHOCARDIOGRAM RESULTS (last 5)  Results for orders placed during the hospital encounter of 01/27/24    Echo    Interpretation Summary    Left Ventricle: The left ventricle is normal in size. Normal wall thickness. Normal wall motion. There is normal systolic function with a visually estimated ejection fraction of 55 - 60%. There is normal diastolic function.    Right Ventricle: Normal right ventricular cavity size. Wall thickness is normal. Right ventricle wall motion  is normal. Systolic function is normal.    Tricuspid Valve: There is mild regurgitation.    IVC/SVC: Normal venous pressure at 3 mmHg.      CURRENT/PREVIOUS VISIT EKG  Results for orders placed or performed during the hospital encounter of 01/27/24   EKG 12-lead    Collection Time: 01/27/24 10:38 PM    Narrative    Test Reason : R55,    Vent. Rate : 072 BPM     Atrial Rate : 072 BPM     P-R Int : 222 ms          QRS Dur : 094 ms      QT Int : 390 ms       P-R-T Axes : 022 -10 085 degrees     QTc Int : 427 ms    Sinus rhythm with 1st degree A-V block  Cannot rule out Anterior infarct ,age undetermined  Abnormal ECG  No previous ECGs available    Referred By: [email or web address]   SELF           Confirmed By:            ASSESSMENT/PLAN:     Active Hospital Problems    Diagnosis    *Syncope    JO (acute kidney injury)    Hypokalemia    Obesity    Hypertension    GERD (gastroesophageal reflux  disease)    Neuropathy    Lactic acidosis       ASSESSMENT & PLAN:     Syncope (new onset)   JO (resolved)   Obesity   HTN   HLD   GERD    RECOMMENDATIONS:    Would like to neurology involved - will need to be cleared from a neuro standpoint in order to have a more thorough cardiac workup completed (angiogram).  Could consider doing an angiogram the day after tomorrow if warranted.  Discussed with patient the risks benefits and options and risks include but not limited to bleeding, vascular damage, renal failure, stroke, myocardial infarction, emergency bypass surgery and death.  All questions have been answered to patient's satisfaction.And patient has voiced that she wants to proceed with the procedure.  For now, continue with aspirin and statin/Zetia therapy.   May need to restart home HTN medications if BP continues to trend back up.   Will continue to follow.     Yoana Angeles NP  Department of Cardiology  Date of Service: 01/30/2024

## 2024-01-30 NOTE — H&P (VIEW-ONLY)
"[facility]  Department of Cardiology  Consult Note      PATIENT NAME: Emily Bryant  MRN: [id number]  TODAY'S DATE: 01/30/2024  ADMIT DATE: 1/27/2024                          CONSULT REQUESTED BY: Lazara Devine MD    SUBJECTIVE     PRINCIPAL PROBLEM: Syncope      REASON FOR CONSULT:  New onset syncope, hypotension       HPI:  Pt is a 53 year old female with PMH of HTN and HLD who presented to the ER a few nights ago with new onset syncope. She states that she was playing a game with her family at the table when she just "passed out." Her  is a retired  medic and was able to guide her head down to avoid it hitting the table. They have a camera in their dining room and were able to catch the whole episode on video. Right before she passed out, she was seen trying to speak but was unable to get any words out. CT in ER was negative.    Per HPI from hospital medicine:  Mrs. Bryant is a 53-years-old female who presented to the ED with complaints of syncope.  Collateral history obtained from family member present at bedside.  Patient states she was in her usual state of health, sitting at a table, playing cards with family, had consumed 3 alcoholic beverages.  Sudden onset of feeling unwell, dizzy sensation described as room spinning, nauseous, diaphoresis, family member stated she subsequently was not verbalizing for about 30 seconds, then slumped over, body became flaccid, episode lasted about 20 seconds before she became responsive, generalized weakness continued for about 1 minute before she returned to baseline.  She denies any preceding chest pain, shortness of breath, focal weakness of extremities, associated incontinence or seizure activity.  No similar episodes in the past.  States has been experiencing congestion over the last few days but denies any fever or chills.  No diarrhea, cystitis symptoms, lower extremity swelling or calf tenderness. Following this episode family " member checked vitals, reported SBP in 90s and heart rate in 60s. Patient states potassium has been running low over the last few months, she is on hydrochlorothiazide for blood pressure control.  In the ED blood pressure borderline 94/54, heart rate in 70s, afebrile, on room air.  Labs with WBC 11, hemoglobin 12.8, sodium 135, potassium 2.9, magnesium 1.6, BUN/creatinine 17/1.5, lactic acid 2.5, glucose 155, troponin 3.5.  EKG sinus rhythm with first-degree with T-wave inversion V1/V2.  She received IV fluid, 40 mEq oral potassium and 2 g IV magnesium supplementation in the ED.  Case discussed with ED provider who requested admission for high-risk syncope.  Plan of care discussed with patient and family member at bedside.       Review of patient's allergies indicates:  No Known Allergies    Past Medical History:   Diagnosis Date    Heartburn      Past Surgical History:   Procedure Laterality Date    ANTERIOR CRUCIATE LIGAMENT REPAIR      AUGMENTATION OF BREAST      BREAST SURGERY       SECTION      CHOLECYSTECTOMY      FOOT SURGERY      TONSILLECTOMY       Social History     Tobacco Use    Smoking status: Former     Types: Cigarettes    Smokeless tobacco: Former    Tobacco comments:     Ex smoker 12 years   Substance Use Topics    Alcohol use: Yes     Comment: occassionally        REVIEW OF SYSTEMS    As mentioned in HPI    OBJECTIVE     VITAL SIGNS (Most Recent)  Temp: 97.6 °F (36.4 °C) (24 1115)  Pulse: 64 (24 111)  Resp: 16 (24 111)  BP: 134/82 (24 1115)  SpO2: 99 % (24)    VENTILATION STATUS  Resp: 16 (24)  SpO2: 99 % (24 1115)           I & O (Last 24H):  Intake/Output Summary (Last 24 hours) at 2024 1425  Last data filed at 2024 1259  Gross per 24 hour   Intake 1320 ml   Output --   Net 1320 ml       WEIGHTS  Wt Readings from Last 3 Encounters:   24 1115 116.3 kg (256 lb 6.4 oz)   24 2226 113.4 kg (250 lb)   24 0940  113.4 kg (250 lb)   10/03/18 0907 90.7 kg (200 lb)       PHYSICAL EXAM    CONSTITUTIONAL: NAD  HEENT: Normocephalic. No pallor  NECK: no JVD  LUNGS: CTA b/l  HEART: regular rate and rhythm, S1, S2 normal, no murmur   ABDOMEN: soft, non-tender, bowel sounds normal  EXTREMITIES: No edema  SKIN: No rash  NEURO: AAO X 3  PSYCH: normal affect      HOME MEDICATIONS:  No current facility-administered medications on file prior to encounter.     Current Outpatient Medications on File Prior to Encounter   Medication Sig Dispense Refill    atorvastatin (LIPITOR) 40 MG tablet Take 40 mg by mouth every morning.      ezetimibe (ZETIA) 10 mg tablet Take 10 mg by mouth once daily.      famotidine (PEPCID) 20 MG tablet Take 20 mg by mouth every evening.      gabapentin (NEURONTIN) 300 MG capsule Take 300 mg by mouth 2 (two) times a day. Takes 1500 and 2100      hydroCHLOROthiazide (HYDRODIURIL) 25 MG tablet Take 25 mg by mouth once daily.      nebivoloL (BYSTOLIC) 10 MG Tab Take 10 mg by mouth every evening.      omeprazole (PRILOSEC) 20 MG capsule Take 20 mg by mouth 2 (two) times a day.      valsartan (DIOVAN) 80 MG tablet Take 80 mg by mouth once daily.      vitamin D (VITAMIN D3) 1000 units Tab Take 1,000 Units by mouth once daily.      traMADol (ULTRAM) 50 mg tablet Take 1 tablet (50 mg total) by mouth every 6 to 8 hours as needed for Pain (Rx# 2083). 28 tablet 0       SCHEDULED MEDS:   aspirin  81 mg Oral Daily    atorvastatin  40 mg Oral QHS    enoxparin  40 mg Subcutaneous Q12H (prophylaxis, 0900/2100)    ezetimibe  10 mg Oral Daily    famotidine  20 mg Oral QHS    gabapentin  300 mg Oral BID       CONTINUOUS INFUSIONS:    PRN MEDS:acetaminophen, dextrose 50%, dextrose 50%, glucagon (human recombinant), glucose, glucose, magnesium oxide, magnesium oxide, naloxone, ondansetron, potassium bicarbonate, potassium bicarbonate, potassium bicarbonate, potassium, sodium phosphates, potassium, sodium phosphates, potassium, sodium  "phosphates, sodium chloride 0.9%, traMADoL    LABS AND DIAGNOSTICS     CBC LAST 3 DAYS  Recent Labs   Lab 01/27/24 2239 01/28/24 0412   WBC 11.42 8.35   RBC 4.54 4.15   HGB 12.8 11.8*   HCT 39.0 35.2*   MCV 86 85   MCH 28.2 28.4   MCHC 32.8 33.5   RDW 13.2 13.2    275   MPV 10.1 10.4   GRAN 50.1  5.7  --    LYMPH 41.2  4.7  --    MONO 6.7  0.8  --    BASO 0.10  --    NRBC 0  --        COAGULATION LAST 3 DAYS  No results for input(s): "LABPT", "INR", "APTT" in the last 168 hours.    CHEMISTRY LAST 3 DAYS  Recent Labs   Lab 01/27/24 2239 01/28/24 0412 01/29/24  1247   * 137 137   K 2.9* 3.9 3.9   CL 99 102 100   CO2 25 25 28   ANIONGAP 11 10 9   BUN 17 15 12   CREATININE 1.5* 1.0 0.8   * 108 109   CALCIUM 9.3 9.0 9.3   MG 1.6 2.3 2.0   ALBUMIN 4.0  --   --    PROT 7.0  --   --    ALKPHOS 81  --   --    ALT 17  --   --    AST 16  --   --    BILITOT 0.4  --   --        CARDIAC PROFILE LAST 3 DAYS  Recent Labs   Lab 01/27/24 2239 01/28/24  0000   TROPONINIHS 3.5 3.2       ENDOCRINE LAST 3 DAYS  Recent Labs   Lab 01/29/24  1247   TSH 1.466       LAST ARTERIAL BLOOD GAS  ABG  No results for input(s): "PH", "PO2", "PCO2", "HCO3", "BE" in the last 168 hours.    LAST 7 DAYS MICROBIOLOGY   Microbiology Results (last 7 days)       ** No results found for the last 168 hours. **            MOST RECENT IMAGING  NM Myocardial Perfusion Spect Multi Pharmacologic  Myocardial Perfusion Imaging with SPECT Gated acquisition and Ejection Fraction two day protocol    CLINICAL INFORMATION:  Chest pain.    PROCEDURE:    Lexiscan is utilized as a pharmacologic stress agent. At peak stress, 24.0 millicuries of technetium Myoview were administered intravenously.  The rest portion of the study is accomplished on the same day, utilizing 24.4 millicuries of technetium Myoview intravenously.    FINDINGS:    There is diminished tracer accumulation within the ventricular apex and adjacent inferolateral/lateral wall, more " pronounced on the stress portion of the examination. This is concerning for pharmacologically induced reversibility. The distribution of activity elsewhere appears unremarkable. The chamber size is within normal limits.  There are no wall motion abnormalities and the estimated ejection fraction is 73%.    IMPRESSION:    ABNORMAL STUDY DEMONSTRATING PARTIALLY REVERSIBLE DEFECT INVOLVING THE APEX AND ADJACENT INFEROLATERAL/LATERAL WALL.    ESTIMATED EJECTION FRACTION OF 73%.    Electronically signed by:  Randolph Hernadez MD  01/30/2024 11:31 AM CST Workstation: [id number]      ECHOCARDIOGRAM RESULTS (last 5)  Results for orders placed during the hospital encounter of 01/27/24    Echo    Interpretation Summary    Left Ventricle: The left ventricle is normal in size. Normal wall thickness. Normal wall motion. There is normal systolic function with a visually estimated ejection fraction of 55 - 60%. There is normal diastolic function.    Right Ventricle: Normal right ventricular cavity size. Wall thickness is normal. Right ventricle wall motion  is normal. Systolic function is normal.    Tricuspid Valve: There is mild regurgitation.    IVC/SVC: Normal venous pressure at 3 mmHg.      CURRENT/PREVIOUS VISIT EKG  Results for orders placed or performed during the hospital encounter of 01/27/24   EKG 12-lead    Collection Time: 01/27/24 10:38 PM    Narrative    Test Reason : R55,    Vent. Rate : 072 BPM     Atrial Rate : 072 BPM     P-R Int : 222 ms          QRS Dur : 094 ms      QT Int : 390 ms       P-R-T Axes : 022 -10 085 degrees     QTc Int : 427 ms    Sinus rhythm with 1st degree A-V block  Cannot rule out Anterior infarct ,age undetermined  Abnormal ECG  No previous ECGs available    Referred By: [email or web address]   SELF           Confirmed By:            ASSESSMENT/PLAN:     Active Hospital Problems    Diagnosis    *Syncope    JO (acute kidney injury)    Hypokalemia    Obesity    Hypertension    GERD (gastroesophageal reflux  disease)    Neuropathy    Lactic acidosis       ASSESSMENT & PLAN:     Syncope (new onset)   JO (resolved)   Obesity   HTN   HLD   GERD    RECOMMENDATIONS:    Would like to neurology involved - will need to be cleared from a neuro standpoint in order to have a more thorough cardiac workup completed (angiogram).  Could consider doing an angiogram the day after tomorrow if warranted.  Discussed with patient the risks benefits and options and risks include but not limited to bleeding, vascular damage, renal failure, stroke, myocardial infarction, emergency bypass surgery and death.  All questions have been answered to patient's satisfaction.And patient has voiced that she wants to proceed with the procedure.  For now, continue with aspirin and statin/Zetia therapy.   May need to restart home HTN medications if BP continues to trend back up.   Will continue to follow.     Yoana Angeles NP  Department of Cardiology  Date of Service: 01/30/2024

## 2024-01-31 LAB
CORTIS SERPL-MCNC: 1 UG/DL
CORTIS SERPL-MCNC: 11.8 UG/DL
CORTIS SERPL-MCNC: 8.5 UG/DL

## 2024-01-31 PROCEDURE — 36415 COLL VENOUS BLD VENIPUNCTURE: CPT | Performed by: HOSPITALIST

## 2024-01-31 PROCEDURE — 82024 ASSAY OF ACTH: CPT | Performed by: HOSPITALIST

## 2024-01-31 PROCEDURE — 25000003 PHARM REV CODE 250: Performed by: INTERNAL MEDICINE

## 2024-01-31 PROCEDURE — 25000003 PHARM REV CODE 250: Performed by: HOSPITALIST

## 2024-01-31 PROCEDURE — 82533 TOTAL CORTISOL: CPT | Mod: 91 | Performed by: HOSPITALIST

## 2024-01-31 PROCEDURE — 21400001 HC TELEMETRY ROOM

## 2024-01-31 PROCEDURE — 99233 SBSQ HOSP IP/OBS HIGH 50: CPT | Mod: ,,, | Performed by: INTERNAL MEDICINE

## 2024-01-31 PROCEDURE — 63600175 PHARM REV CODE 636 W HCPCS: Performed by: HOSPITALIST

## 2024-01-31 PROCEDURE — 95819 EEG AWAKE AND ASLEEP: CPT

## 2024-01-31 RX ORDER — LIDOCAINE HYDROCHLORIDE 10 MG/ML
1 INJECTION, SOLUTION EPIDURAL; INFILTRATION; INTRACAUDAL; PERINEURAL ONCE AS NEEDED
Status: DISCONTINUED | OUTPATIENT
Start: 2024-01-31 | End: 2024-02-01 | Stop reason: HOSPADM

## 2024-01-31 RX ORDER — COSYNTROPIN 0.25 MG/ML
0.25 INJECTION, POWDER, FOR SOLUTION INTRAMUSCULAR; INTRAVENOUS ONCE
Status: COMPLETED | OUTPATIENT
Start: 2024-01-31 | End: 2024-01-31

## 2024-01-31 RX ADMIN — ASPIRIN 81 MG: 81 TABLET, COATED ORAL at 08:01

## 2024-01-31 RX ADMIN — ENOXAPARIN SODIUM 40 MG: 40 INJECTION SUBCUTANEOUS at 08:01

## 2024-01-31 RX ADMIN — GABAPENTIN 300 MG: 300 CAPSULE ORAL at 08:01

## 2024-01-31 RX ADMIN — ACETAMINOPHEN 650 MG: 325 TABLET ORAL at 08:01

## 2024-01-31 RX ADMIN — GABAPENTIN 300 MG: 300 CAPSULE ORAL at 10:01

## 2024-01-31 RX ADMIN — ENOXAPARIN SODIUM 40 MG: 40 INJECTION SUBCUTANEOUS at 10:01

## 2024-01-31 RX ADMIN — PANTOPRAZOLE SODIUM 40 MG: 40 TABLET, DELAYED RELEASE ORAL at 05:01

## 2024-01-31 RX ADMIN — EZETIMIBE 10 MG: 10 TABLET ORAL at 08:01

## 2024-01-31 RX ADMIN — ATORVASTATIN CALCIUM 40 MG: 40 TABLET, FILM COATED ORAL at 10:01

## 2024-01-31 RX ADMIN — COSYNTROPIN 0.25 MG: 0.25 INJECTION, POWDER, LYOPHILIZED, FOR SOLUTION INTRAVENOUS at 05:01

## 2024-01-31 NOTE — CONSULTS
Former[facility]  Neurology Consult    Patient Name: Emily Bryant  MRN: [id number]  : 1970  TODAY'S DATE: 2024  ADMIT DATE: 2024 10:21 PM                                          CONSULTED PROVIDER: Allison Hoffmann MD, Neurologist. On-call Phone: [phone]  CONSULT REQUESTED BY: Lazara Devine MD     Chief Complaint   Patient presents with    Loss of Consciousness     Possible syncopal episode, sitting at table and became pale and diaphoretic, nausea, lightheaded and dizzy. Family states pt looked as if she passed out, when came to, she had trouble speaking and couldn't hold both arms up. Pt bp was 90s systolic.       HPI per EMR:  Mrs. Bryant is a 53-years-old female who presented to the ED with complaints of syncope.  Collateral history obtained from family member present at bedside.  Patient states she was in her usual state of health, sitting at a table, playing cards with family, had consumed 3 alcoholic beverages.  Sudden onset of feeling unwell, dizzy sensation described as room spinning, nauseous, diaphoresis, family member stated she subsequently was not verbalizing for about 30 seconds, then slumped over, body became flaccid, episode lasted about 20 seconds before she became responsive, generalized weakness continued for about 1 minute before she returned to baseline.  She denies any preceding chest pain, shortness of breath, focal weakness of extremities, associated incontinence or seizure activity.  No similar episodes in the past.  States has been experiencing congestion over the last few days but denies any fever or chills.  No diarrhea, cystitis symptoms, lower extremity swelling or calf tenderness. Following this episode family member checked vitals, reported SBP in 90s and heart rate in 60s. Patient states potassium has been running low over the last few months, she is on hydrochlorothiazide for blood pressure control.  In the ED blood pressure  borderline 94/54, heart rate in 70s, afebrile, on room air.  Labs with WBC 11, hemoglobin 12.8, sodium 135, potassium 2.9, magnesium 1.6, BUN/creatinine 17/1.5, lactic acid 2.5, glucose 155, troponin 3.5.  EKG sinus rhythm with first-degree with T-wave inversion V1/V2.  She received IV fluid, 40 mEq oral potassium and 2 g IV magnesium supplementation in the ED.  Case discussed with ED provider who requested admission for high-risk syncope.  Plan of care discussed with patient and family member at bedside.     Overview/Hospital Course:  Patient was admitted with near syncope vs TIA-like symptoms.  showed a video of her with family when she seemed to have a brief period of dissociation to speech and awareness. She became weak with dizziness, nausea, spinning of the room, and slumping over the torso. No precipitating angina, visual changes, SOB, fever or chills. At the time of incident she was playing cards with family and had only small amount of alcohol intake, In ED she was found to be hypotensive, hypokalemic, hyponatremic, hypomagnesemia, elevated lactate 2.5, 1st degree AV block on bystolic with HR in 70s and O2 sats 985 on RA.   THere was no head CT on admit. EKG showed T wave inversion on V1/V2. Normal trponin x 2. Blood pressure Normal range though holding home medications. After discussion with patient and watching video, will hold discharge today. Plan for NST in am. ECHo reviewed. EF 55-60%. No valve disease. No diastolic dysfunction. Head and neck CTA. Plan for holter monitor on dc home and follow up cardiology. Check thyroid levels, HA1c, B12 and orthostatics.   No large vessel disease on head neck CTA. Noted abnormal NST today. Patient describes intermittent mild chest discomfort over last several months. Cardiology consulted and would like neurology to see before [facility] takes place.    Neurology Consult: Patient was seen and examined by me today. She is a 52 yo woman with no significant past medical  history who presented to the ED after witnessed syncopal event. She was in her usual state of health until the day of admit when she experienced sudden onset dizziness, nausea and diaphoresis while she was playing cards sitting at a table. She then was unable to speak for less than a minute, then slumped over in her chair, and became flaccid. She was unresponsive during this time, with the whole event lasting less than 2 minutes, after which she recovered and went to baseline in less than a minute after. She denies chest pain or shortness of breath prior to the event. She does not have a history of seizures, and denied jerking of the extremities or incontinence. On arrival, she was mildly hypotensive with BP 94/54 mmHg, HR in the 70s.Cardiology consulted and noted EKG was abnormal, so will undergo LHC in the next few days. Questionable adrenal insufficiency diagnosed by [facility].  Admitted for syncope vs seizure workup.    Review of Systems:    A comprehensive ROS was performed and all systems were negative except as noted above in HPI.    Past Medical History:  has a past medical history of Heartburn.    Past Surgical History:  has a past surgical history that includes Anterior cruciate ligament repair; Foot surgery; Breast surgery;  section; Cholecystectomy; Tonsillectomy; and Augmentation of breast.    Family Medical History: family history includes Heart disease in her father.    Social History:  reports that she has quit smoking. Her smoking use included cigarettes. She has quit using smokeless tobacco. She reports current alcohol use.    PCP: Tim Burton MD    Allergies: Review of patient's allergies indicates:  No Known Allergies    Medications:   No current facility-administered medications on file prior to encounter.     Current Outpatient Medications on File Prior to Encounter   Medication Sig Dispense Refill    atorvastatin (LIPITOR) 40 MG tablet Take 40 mg by mouth every  morning.      ezetimibe (ZETIA) 10 mg tablet Take 10 mg by mouth once daily.      famotidine (PEPCID) 20 MG tablet Take 20 mg by mouth every evening.      gabapentin (NEURONTIN) 300 MG capsule Take 300 mg by mouth 2 (two) times a day. Takes 1500 and 2100      hydroCHLOROthiazide (HYDRODIURIL) 25 MG tablet Take 25 mg by mouth once daily.      nebivoloL (BYSTOLIC) 10 MG Tab Take 10 mg by mouth every evening.      omeprazole (PRILOSEC) 20 MG capsule Take 20 mg by mouth 2 (two) times a day.      valsartan (DIOVAN) 80 MG tablet Take 80 mg by mouth once daily.      vitamin D (VITAMIN D3) 1000 units Tab Take 1,000 Units by mouth once daily.      traMADol (ULTRAM) 50 mg tablet Take 1 tablet (50 mg total) by mouth every 6 to 8 hours as needed for Pain (Rx# 2083). 28 tablet 0     Scheduled Meds:   aspirin  81 mg Oral Daily    atorvastatin  40 mg Oral QHS    enoxparin  40 mg Subcutaneous Q12H (prophylaxis, 0900/2100)    ezetimibe  10 mg Oral Daily    gabapentin  300 mg Oral BID    pantoprazole  40 mg Oral Before breakfast     Continuous Infusions:  PRN Meds:.acetaminophen, calcium carbonate, dextrose 50%, dextrose 50%, glucagon (human recombinant), glucose, glucose, magnesium oxide, magnesium oxide, naloxone, ondansetron, potassium bicarbonate, potassium bicarbonate, potassium bicarbonate, potassium, sodium phosphates, potassium, sodium phosphates, potassium, sodium phosphates, sodium chloride 0.9%, traMADoL      Physical Exam  Current Vitals:  Vitals:    01/31/24 0714   BP: 125/66   Pulse: 67   Resp: 18   Temp: 97.7 °F (36.5 °C)       Physical Exam:  General: AO x4  HEENT: PERRL, EOMI  CV: RRR  Lungs: no respiratory distress  Abdomen: soft, non tender  Extremities: joint hypermobility noted    Neurological Exam    MENTAL STATUS EXAM:  Level of alertness: Alert  Level of attention: Attentive w/out deficit  Orientation/Awareness: intact to person, place, time, situation  Language: fluent. Comprehension/repetition/naming  "intact    CRANIAL NERVE EXAM:  II/III: fundoscopic exam deferred, PERRL; visual fields full to confrontation  III/IV/VI: EOMI w/out evidence of nystagmus, strabismus, or evoked diplopia  V: no deficits appreciated to light touch  VII: no facial asymmetry noted  VIII: no deficits in hearing bilaterally  IX/X: palate @ ML and raises symmetrically  XI: shoulder shrug 5/5 bilaterally; head turn 5/5 bilaterally  XII: tongue to midline w/out asymmetry  No dysarthria noted on exam.    MOTOR EXAM:  Bulk and Tone: normal throughout  Strength is 5/5 proximally and distally in upper and lower extremities without deficits.  No pronator drift in bilateral UE. No tremor either at rest or with intention.    REFLEXES:  2+ in bilateral upper and lower extremities, no Izquierdo's, no clonus. Downgoing plantars.    SENSORY EXAM  Light touch intact throughout in upper and lower extremities without deficits.    COORDINATION/CEREBELLAR EXAM:  FTN: no dysmetria or other signs of appendicular ataxia  HTS: No evidence of appendicular ataxia    GAIT:  Deferred for safety.    Laboratory Data & Studies    Recent Labs   Lab 01/27/24 2239 01/28/24 0412   WBC 11.42 8.35   HGB 12.8 11.8*    275   MCV 86 85       Recent Labs   Lab 01/27/24 2239 01/28/24 0412 01/29/24  1247   * 137 137   K 2.9* 3.9 3.9   CL 99 102 100   CO2 25 25 28   BUN 17 15 12   * 108 109   CALCIUM 9.3 9.0 9.3   MG 1.6 2.3 2.0   PHOS 3.5 3.8  --        Recent Labs   Lab 01/27/24 2239   PROT 7.0   ALBUMIN 4.0   BILITOT 0.4   AST 16   ALT 17   ALKPHOS 81       No results for input(s): "LABPT", "INR", "APTT" in the last 168 hours.    Recent Labs   Lab 01/29/24  1247   TSH 1.466         Microbiology:  Microbiology Results (last 7 days)       ** No results found for the last 168 hours. **              Imaging:  Nuclear Stress Test    Result Date: 1/30/2024    The ECG portion of the study is negative for ischemia.   The patient reported no chest pain during the " stress test.   There were no arrhythmias during stress.   The nuclear portion of this study will be reported separately.     NM Myocardial Perfusion Spect Multi Pharmacologic    Result Date: 1/30/2024  Myocardial Perfusion Imaging with SPECT Gated acquisition and Ejection Fraction two day protocol CLINICAL INFORMATION:  Chest pain. PROCEDURE: Lexiscan is utilized as a pharmacologic stress agent. At peak stress, 24.0 millicuries of technetium Myoview were administered intravenously.  The rest portion of the study is accomplished on the same day, utilizing 24.4 millicuries of technetium Myoview intravenously. FINDINGS: There is diminished tracer accumulation within the ventricular apex and adjacent inferolateral/lateral wall, more pronounced on the stress portion of the examination. This is concerning for pharmacologically induced reversibility. The distribution of activity elsewhere appears unremarkable. The chamber size is within normal limits.  There are no wall motion abnormalities and the estimated ejection fraction is 73%. IMPRESSION: ABNORMAL STUDY DEMONSTRATING PARTIALLY REVERSIBLE DEFECT INVOLVING THE APEX AND ADJACENT INFEROLATERAL/LATERAL WALL. ESTIMATED EJECTION FRACTION OF 73%. Electronically signed by:  Randolph Hernadez MD  01/30/2024 11:31 AM CST Workstation: [id number]    CTA Head and Neck (xpd)    Result Date: 1/29/2024  CMS MANDATED QUALITY DATA - CT RADIATION - 436 One or more of the following dose-optimizing techniques was utilized for this exam: automated exposure control, adjustment of the mA and/or kV according to patient size, and/or use of iterative reconstruction technique. CMS MANDATED QUALITY [id number] NASCET CRITERIA WAS UTILIZED FOR ESTIMATION OF STENOSIS SEVERITY WITH THE NARROWEST SEGMENT BEING COMPARED TO THE DISTAL LUMINAL DIAMETER. HISTORY:TIA. TECHNIQUE: Serial axial images were obtained from aortic arch to the vertex with 100 cc of Omnipaque 350 intravenous contrast utilizing a  CT angiography protocol. Coronal and sagittal MIP CT angiography reconstructions were performed. Patient received approximately 361 mGy-cm of radiation exposure from this exam. COMPARISON: No previous study for comparison. FINDINGS:Aortic arch is normal in appearance. Great vessel origins appear widely patent. Bilateral cervical carotid arteries appear widely patent with minimal atherosclerotic change at the carotid bifurcations without evidence of significant stenosis. Codominant patent cervical vertebral arteries are visualized without significant stenosis identified. Internal carotid arteries appear widely patent at the skull base. Codominant vertebral arteries are visualized with widely patent basilar artery noted. Bilateral anterior, middle, and posterior cerebral arteries appear patent without evidence of large vessel filling defect or significant stenosis. No vascular malformation is identified. Small mucous retention cyst of the left maxillary sinus is present. IMPRESSION: 1. Minimal atherosclerotic changes of the cervical carotid arteries. 2. No evidence of cervical carotid/vertebral artery occlusion or significant stenosis. 3. No evidence of large vessel intracranial arterial occlusion or significant stenosis identified. Electronically signed by:  Navi Tomas MD  01/29/2024 04:52 PM CST Workstation: [id number]    Echo    Result Date: 1/28/2024    Left Ventricle: The left ventricle is normal in size. Normal wall thickness. Normal wall motion. There is normal systolic function with a visually estimated ejection fraction of 55 - 60%. There is normal diastolic function.   Right Ventricle: Normal right ventricular cavity size. Wall thickness is normal. Right ventricle wall motion  is normal. Systolic function is normal.   Tricuspid Valve: There is mild regurgitation.   IVC/SVC: Normal venous pressure at 3 mmHg.         Assessment and Plan:    Syncope vs seizure  Hypotension and bradycardia - concern for  adrenal insufficiency   Hypermobility syndrome  54 yo woman with no significant past medical history who presented to the ED after witnessed syncopal event preceded by prodromal symptoms, with no post ictal state, incontinence, or shaking. Most likely, this was a syncopal event, but need to rule out seizure as well. Admitted for syncope vs seizure workup.  - Admitted to hospital medicine with q4 hour neuro checks, on telemetry, continuous pulse oximetry  - Seizure precautions while inpatient  - Diet after eval per SLP  - Hold off on AEDs, since this is patient's first event and it was most likely syncope  - Ordered EEG routine which resulted normal, with no epileptiform activity  - Ordered MRI brain without contrast which showed some microvascular disease but no evidence of stroke  - Secondary stroke prevention with ASA 81 mg daily, atorvastatin 40 mg daily and ezetimibe 10 mg daily  - Cardiology consulted and following, possible LHC to be performed during this admit. Cleared from neurology's perspective to have procedure   - Avoid seizure threshold lowering medications such as:  Bupropion, diphenhydramine, tramadol, certain antibiotics  - Primary team to speak with patient's endocrinologist about further testing for adrenal insufficiency   - patient will need follow-up as outpatient with Neurology for long-term EEG monitoring to better characterize epileptiform abnormalities  - Maintain Euthermia with Tylenol prn temp > 37.2 degrees C.  - Assessment for rehab with PT/OT/SLP evaluation and treatment.  - workup and treatment of metabolic and infectious abnormalities as per primary team  - Will follow peripherally. Please call with questions, concerns or neurological decline.    Seizure education:  No driving for now, no swimming alone, no climbing high areas, no operation of heavy machinery or working with high risk electricity equipment.  Patient and/or next of kin informed.      DVT prophylaxis with chemo/SCD  prophylaxis      Patient to follow up with [facility] at [phone] within 7 days from discharge.     Stroke education was provided including stroke risk factors modification and any acute neurological changes including weakness, confusion, visual changes to come straight to the ER.     All questions were answered.                              Thank you kindly for including us in the care of this patient. Please do not hesitate to contact us with any questions.      65 minutes of care time has been spent evaluating with the patient. Time includes chart review not limited to diagnostic imaging, labs, and vitals, patient assessment, discussion with family and nursing, current order evaluations, and new order entries.      Allison Hoffmann MD  Neurology

## 2024-01-31 NOTE — PROGRESS NOTES
"[facility]  Department of Cardiology  Consult Note      PATIENT NAME: Emily Bryant  MRN: [id number]  TODAY'S DATE: 01/31/2024  ADMIT DATE: 1/27/2024                          CONSULT REQUESTED BY: Lazara Devine MD    SUBJECTIVE     PRINCIPAL PROBLEM: Syncope      REASON FOR CONSULT:  New onset syncope, hypotension     Interval history:  1/31/24:  Pt was seen this morning during rounds with  at the bedside. She states that she had spoken to her endocrinologist yesterday evening who had some concerns with her cortisol levels. She has an appointment next week with him to discuss this further. Angiogram risks and benefits were discussed again and pt appears to be open to whatever the team believes is best.     HPI:  Pt is a 53 year old female with PMH of HTN and HLD who presented to the ER a few nights ago with new onset syncope. She states that she was playing a game with her family at the table when she just "passed out." Her  is a retired  medic and was able to guide her head down to avoid it hitting the table. They have a camera in their dining room and were able to catch the whole episode on video. Right before she passed out, she was seen trying to speak but was unable to get any words out. CT in ER was negative.    Per HPI from hospital medicine:  Mrs. Bryant is a 53-years-old female who presented to the ED with complaints of syncope.  Collateral history obtained from family member present at bedside.  Patient states she was in her usual state of health, sitting at a table, playing cards with family, had consumed 3 alcoholic beverages.  Sudden onset of feeling unwell, dizzy sensation described as room spinning, nauseous, diaphoresis, family member stated she subsequently was not verbalizing for about 30 seconds, then slumped over, body became flaccid, episode lasted about 20 seconds before she became responsive, generalized weakness continued for about 1 minute " before she returned to baseline.  She denies any preceding chest pain, shortness of breath, focal weakness of extremities, associated incontinence or seizure activity.  No similar episodes in the past.  States has been experiencing congestion over the last few days but denies any fever or chills.  No diarrhea, cystitis symptoms, lower extremity swelling or calf tenderness. Following this episode family member checked vitals, reported SBP in 90s and heart rate in 60s. Patient states potassium has been running low over the last few months, she is on hydrochlorothiazide for blood pressure control.  In the ED blood pressure borderline 94/54, heart rate in 70s, afebrile, on room air.  Labs with WBC 11, hemoglobin 12.8, sodium 135, potassium 2.9, magnesium 1.6, BUN/creatinine 17/1.5, lactic acid 2.5, glucose 155, troponin 3.5.  EKG sinus rhythm with first-degree with T-wave inversion V1/V2.  She received IV fluid, 40 mEq oral potassium and 2 g IV magnesium supplementation in the ED.  Case discussed with ED provider who requested admission for high-risk syncope.  Plan of care discussed with patient and family member at bedside.       Review of patient's allergies indicates:  No Known Allergies    Past Medical History:   Diagnosis Date    Heartburn      Past Surgical History:   Procedure Laterality Date    ANTERIOR CRUCIATE LIGAMENT REPAIR      AUGMENTATION OF BREAST      BREAST SURGERY       SECTION      CHOLECYSTECTOMY      FOOT SURGERY      TONSILLECTOMY       Social History     Tobacco Use    Smoking status: Former     Types: Cigarettes    Smokeless tobacco: Former    Tobacco comments:     Ex smoker 12 years   Substance Use Topics    Alcohol use: Yes     Comment: occassionally        REVIEW OF SYSTEMS    As mentioned in HPI    OBJECTIVE     VITAL SIGNS (Most Recent)  Temp: 97.7 °F (36.5 °C) (24)  Pulse: 67 (2414)  Resp: 18 (24)  BP: 125/66 (2414)  SpO2: 95 % (24  0714)    VENTILATION STATUS  Resp: 18 (01/31/24 0714)  SpO2: 95 % (01/31/24 0714)           I & O (Last 24H):  Intake/Output Summary (Last 24 hours) at 1/31/2024 1446  Last data filed at 1/30/2024 1630  Gross per 24 hour   Intake 480 ml   Output --   Net 480 ml         WEIGHTS  Wt Readings from Last 3 Encounters:   01/28/24 1115 116.3 kg (256 lb 6.4 oz)   01/27/24 2226 113.4 kg (250 lb)   01/28/24 0940 113.4 kg (250 lb)   10/03/18 0907 90.7 kg (200 lb)       PHYSICAL EXAM    CONSTITUTIONAL: NAD  HEENT: Normocephalic. No pallor  NECK: no JVD  LUNGS: CTA b/l  HEART: regular rate and rhythm, S1, S2 normal, no murmur   ABDOMEN: soft, non-tender, bowel sounds normal  EXTREMITIES: No edema  SKIN: No rash  NEURO: AAO X 3  PSYCH: normal affect      HOME MEDICATIONS:  No current facility-administered medications on file prior to encounter.     Current Outpatient Medications on File Prior to Encounter   Medication Sig Dispense Refill    atorvastatin (LIPITOR) 40 MG tablet Take 40 mg by mouth every morning.      ezetimibe (ZETIA) 10 mg tablet Take 10 mg by mouth once daily.      famotidine (PEPCID) 20 MG tablet Take 20 mg by mouth every evening.      gabapentin (NEURONTIN) 300 MG capsule Take 300 mg by mouth 2 (two) times a day. Takes 1500 and 2100      hydroCHLOROthiazide (HYDRODIURIL) 25 MG tablet Take 25 mg by mouth once daily.      nebivoloL (BYSTOLIC) 10 MG Tab Take 10 mg by mouth every evening.      omeprazole (PRILOSEC) 20 MG capsule Take 20 mg by mouth 2 (two) times a day.      valsartan (DIOVAN) 80 MG tablet Take 80 mg by mouth once daily.      vitamin D (VITAMIN D3) 1000 units Tab Take 1,000 Units by mouth once daily.      traMADol (ULTRAM) 50 mg tablet Take 1 tablet (50 mg total) by mouth every 6 to 8 hours as needed for Pain (Rx# [id number]). 28 tablet 0       SCHEDULED MEDS:   aspirin  81 mg Oral Daily    atorvastatin  40 mg Oral QHS    enoxparin  40 mg Subcutaneous Q12H (prophylaxis, 0900/2100)    ezetimibe  10 mg Oral  "Daily    gabapentin  300 mg Oral BID    pantoprazole  40 mg Oral Before breakfast       CONTINUOUS INFUSIONS:    PRN MEDS:acetaminophen, calcium carbonate, dextrose 50%, dextrose 50%, glucagon (human recombinant), glucose, glucose, magnesium oxide, magnesium oxide, naloxone, ondansetron, potassium bicarbonate, potassium bicarbonate, potassium bicarbonate, potassium, sodium phosphates, potassium, sodium phosphates, potassium, sodium phosphates, sodium chloride 0.9%, traMADoL    LABS AND DIAGNOSTICS     CBC LAST 3 DAYS  Recent Labs   Lab 01/27/24 2239 01/28/24  0412   WBC 11.42 8.35   RBC 4.54 4.15   HGB 12.8 11.8*   HCT 39.0 35.2*   MCV 86 85   MCH 28.2 28.4   MCHC 32.8 33.5   RDW 13.2 13.2    275   MPV 10.1 10.4   GRAN 50.1  5.7  --    LYMPH 41.2  4.7  --    MONO 6.7  0.8  --    BASO 0.10  --    NRBC 0  --          COAGULATION LAST 3 DAYS  No results for input(s): "LABPT", "INR", "APTT" in the last 168 hours.    CHEMISTRY LAST 3 DAYS  Recent Labs   Lab 01/27/24 2239 01/28/24 0412 01/29/24  1247   * 137 137   K 2.9* 3.9 3.9   CL 99 102 100   CO2 25 25 28   ANIONGAP 11 10 9   BUN 17 15 12   CREATININE 1.5* 1.0 0.8   * 108 109   CALCIUM 9.3 9.0 9.3   MG 1.6 2.3 2.0   ALBUMIN 4.0  --   --    PROT 7.0  --   --    ALKPHOS 81  --   --    ALT 17  --   --    AST 16  --   --    BILITOT 0.4  --   --          CARDIAC PROFILE LAST 3 DAYS  Recent Labs   Lab 01/27/24 2239 01/28/24  0000   TROPONINIHS 3.5 3.2         ENDOCRINE LAST 3 DAYS  Recent Labs   Lab 01/29/24  1247   TSH 1.466         LAST ARTERIAL BLOOD GAS  ABG  No results for input(s): "PH", "PO2", "PCO2", "HCO3", "BE" in the last 168 hours.    LAST 7 DAYS MICROBIOLOGY   Microbiology Results (last 7 days)       ** No results found for the last 168 hours. **            MOST RECENT IMAGING  MRI Brain Without Contrast  MRI brain without contrast    CLINICAL DATA: Neurologic deficit, stroke suspected. Syncopal episode.    FINDINGS: Multiplanar " noncontrast imaging was performed. Comparison is made to May 2014.    There is no evidence of intracranial mass, hemorrhage, or midline shift. Ventricles and sulci are normal. There are no pathologic extra-axial fluid collections.    There is no evidence of acute ischemic change. On FLAIR images, a 10 mm hyperintense focus is noted at the posterior centrum semiovale on the right. This is new compared to the prior study. While its appearance is nonspecific, this may reflect mild microvascular white matter ischemic change. Demyelinating disease is felt less likely.    The cerebellum and brainstem are unremarkable. The orbits, paranasal sinuses, skull base, and sella turcica are within normal limits.    IMPRESSION:  1. No acute MR abnormalities.  2. 10 mm white matter hyperintensity in the right cerebral hemisphere, new when compared to 2014. While nonspecific, this is likely on the basis of mild chronic microvascular white matter ischemic change.    Electronically signed by:  Juanito Gill MD  01/31/2024 11:44 AM CST Workstation: [id number]      ECHOCARDIOGRAM RESULTS (last 5)  Results for orders placed during the hospital encounter of 01/27/24    Echo    Interpretation Summary    Left Ventricle: The left ventricle is normal in size. Normal wall thickness. Normal wall motion. There is normal systolic function with a visually estimated ejection fraction of 55 - 60%. There is normal diastolic function.    Right Ventricle: Normal right ventricular cavity size. Wall thickness is normal. Right ventricle wall motion  is normal. Systolic function is normal.    Tricuspid Valve: There is mild regurgitation.    IVC/SVC: Normal venous pressure at 3 mmHg.      CURRENT/PREVIOUS VISIT EKG  Results for orders placed or performed during the hospital encounter of 01/27/24   EKG 12-lead    Collection Time: 01/27/24 10:38 PM    Narrative    Test Reason : R55,    Vent. Rate : 072 BPM     Atrial Rate : 072 BPM     P-R Int : 222 ms           QRS Dur : 094 ms      QT Int : 390 ms       P-R-T Axes : 022 -10 085 degrees     QTc Int : 427 ms    Sinus rhythm with 1st degree A-V block  Cannot rule out Anterior infarct ,age undetermined  Abnormal ECG  No previous ECGs available    Referred By: [email or web address]   SELF           Confirmed By:            ASSESSMENT/PLAN:     Active Hospital Problems    Diagnosis    *Syncope    JO (acute kidney injury)    Hypokalemia    Obesity    Hypertension    GERD (gastroesophageal reflux disease)    Neuropathy    Lactic acidosis       ASSESSMENT & PLAN:     Syncope (new onset)   Positive stress test   JO (resolved)   Obesity   HTN   HLD   GERD    RECOMMENDATIONS:    Will need to hold off on angiogram until she is evaluated by neurology.   Angiogram could be considered as an outpatient procedure, as the syncopal episode pt experienced is unlikely cardiology related.   Will continue to follow at this time.   For now, continue with aspirin and statin therapy as well as Lovenox Q12.     Yoana Angeles NP  Department of Cardiology  Date of Service: 01/31/2024

## 2024-02-01 VITALS
SYSTOLIC BLOOD PRESSURE: 174 MMHG | BODY MASS INDEX: 40.24 KG/M2 | RESPIRATION RATE: 16 BRPM | TEMPERATURE: 98 F | OXYGEN SATURATION: 98 % | DIASTOLIC BLOOD PRESSURE: 85 MMHG | HEIGHT: 67 IN | HEART RATE: 80 BPM | WEIGHT: 256.38 LBS

## 2024-02-01 PROCEDURE — 25500020 PHARM REV CODE 255: Performed by: INTERNAL MEDICINE

## 2024-02-01 PROCEDURE — C1769 GUIDE WIRE: HCPCS | Performed by: INTERNAL MEDICINE

## 2024-02-01 PROCEDURE — B2111ZZ FLUOROSCOPY OF MULTIPLE CORONARY ARTERIES USING LOW OSMOLAR CONTRAST: ICD-10-PCS | Performed by: INTERNAL MEDICINE

## 2024-02-01 PROCEDURE — 63600175 PHARM REV CODE 636 W HCPCS: Performed by: INTERNAL MEDICINE

## 2024-02-01 PROCEDURE — C1894 INTRO/SHEATH, NON-LASER: HCPCS | Performed by: INTERNAL MEDICINE

## 2024-02-01 PROCEDURE — 25000003 PHARM REV CODE 250: Performed by: INTERNAL MEDICINE

## 2024-02-01 PROCEDURE — 4A023N7 MEASUREMENT OF CARDIAC SAMPLING AND PRESSURE, LEFT HEART, PERCUTANEOUS APPROACH: ICD-10-PCS | Performed by: INTERNAL MEDICINE

## 2024-02-01 PROCEDURE — 63600175 PHARM REV CODE 636 W HCPCS: Performed by: HOSPITALIST

## 2024-02-01 PROCEDURE — 25000003 PHARM REV CODE 250: Performed by: HOSPITALIST

## 2024-02-01 PROCEDURE — 93454 CORONARY ARTERY ANGIO S&I: CPT | Mod: 26,,, | Performed by: INTERNAL MEDICINE

## 2024-02-01 PROCEDURE — C1887 CATHETER, GUIDING: HCPCS | Performed by: INTERNAL MEDICINE

## 2024-02-01 PROCEDURE — 93454 CORONARY ARTERY ANGIO S&I: CPT | Performed by: INTERNAL MEDICINE

## 2024-02-01 RX ORDER — FENTANYL CITRATE 50 UG/ML
INJECTION, SOLUTION INTRAMUSCULAR; INTRAVENOUS
Status: DISCONTINUED | OUTPATIENT
Start: 2024-02-01 | End: 2024-02-01 | Stop reason: HOSPADM

## 2024-02-01 RX ORDER — LIDOCAINE HYDROCHLORIDE 10 MG/ML
INJECTION INFILTRATION; PERINEURAL
Status: DISCONTINUED | OUTPATIENT
Start: 2024-02-01 | End: 2024-02-01 | Stop reason: HOSPADM

## 2024-02-01 RX ORDER — PREDNISONE 5 MG/1
5 TABLET ORAL DAILY
Qty: 30 TABLET | Refills: 0 | Status: SHIPPED | OUTPATIENT
Start: 2024-02-01 | End: 2024-03-02

## 2024-02-01 RX ORDER — ASPIRIN 81 MG/1
81 TABLET ORAL DAILY
Refills: 0
Start: 2024-02-02 | End: 2025-02-01

## 2024-02-01 RX ORDER — NITROGLYCERIN 5 MG/ML
INJECTION, SOLUTION INTRAVENOUS
Status: DISCONTINUED | OUTPATIENT
Start: 2024-02-01 | End: 2024-02-01 | Stop reason: HOSPADM

## 2024-02-01 RX ORDER — MIDAZOLAM HYDROCHLORIDE 1 MG/ML
INJECTION INTRAMUSCULAR; INTRAVENOUS
Status: DISCONTINUED | OUTPATIENT
Start: 2024-02-01 | End: 2024-02-01 | Stop reason: HOSPADM

## 2024-02-01 RX ORDER — DIPHENHYDRAMINE HCL 25 MG
50 CAPSULE ORAL
Status: CANCELLED | OUTPATIENT
Start: 2024-02-01

## 2024-02-01 RX ORDER — HEPARIN SODIUM 10000 [USP'U]/ML
INJECTION, SOLUTION INTRAVENOUS; SUBCUTANEOUS
Status: DISCONTINUED | OUTPATIENT
Start: 2024-02-01 | End: 2024-02-01 | Stop reason: HOSPADM

## 2024-02-01 RX ORDER — SODIUM CHLORIDE 9 MG/ML
INJECTION, SOLUTION INTRAVENOUS ONCE
Status: CANCELLED | OUTPATIENT
Start: 2024-02-01 | End: 2024-02-01

## 2024-02-01 RX ADMIN — ASPIRIN 81 MG: 81 TABLET, COATED ORAL at 02:02

## 2024-02-01 RX ADMIN — ENOXAPARIN SODIUM 40 MG: 40 INJECTION SUBCUTANEOUS at 09:02

## 2024-02-01 RX ADMIN — EZETIMIBE 10 MG: 10 TABLET ORAL at 02:02

## 2024-02-01 RX ADMIN — GABAPENTIN 300 MG: 300 CAPSULE ORAL at 02:02

## 2024-02-01 RX ADMIN — PANTOPRAZOLE SODIUM 40 MG: 40 TABLET, DELAYED RELEASE ORAL at 02:02

## 2024-02-01 RX ADMIN — ACETAMINOPHEN 650 MG: 325 TABLET ORAL at 02:02

## 2024-02-01 NOTE — INTERVAL H&P NOTE
The patient has been examined and the H&P has been reviewed:    I concur with the findings and no changes have occurred since H&P was written.    Procedure risks, benefits and alternative options discussed and understood by patient/family.          Active Hospital Problems    Diagnosis  POA    *Syncope [R55]  Yes    JO (acute kidney injury) [N17.9]  Yes    Hypokalemia [E87.6]  Yes    Obesity [E66.9]  Yes     Chronic    Hypertension [I10]  Yes     Chronic    GERD (gastroesophageal reflux disease) [K21.9]  Yes     Chronic    Neuropathy [G62.9]  Yes     Chronic    Lactic acidosis [E87.20]  Yes      Resolved Hospital Problems   No resolved problems to display.

## 2024-02-01 NOTE — ASSESSMENT & PLAN NOTE
Patient with acute kidney injury/acute renal failure likely due to pre-renal azotemia due to dehydration JO is currently improving. Baseline creatinine  1.0  - Labs reviewed- Renal function/electrolytes with Estimated Creatinine Clearance: 109.7 mL/min (based on SCr of 0.8 mg/dL). according to latest data. Monitor urine output and serial BMP and adjust therapy as needed. Avoid nephrotoxins and renally dose meds for GFR listed above.    Resolved  Hold diuretics

## 2024-02-01 NOTE — PLAN OF CARE
Problem: Adult Inpatient Plan of Care  Goal: Plan of Care Review  Outcome: Ongoing, Progressing  Goal: Patient-Specific Goal (Individualized)  Outcome: Ongoing, Progressing     Problem: Fluid and Electrolyte Imbalance (Acute Kidney Injury/Impairment)  Goal: Fluid and Electrolyte Balance  Outcome: Ongoing, Progressing     Problem: Oral Intake Inadequate (Acute Kidney Injury/Impairment)  Goal: Optimal Nutrition Intake  Outcome: Ongoing, Progressing     Problem: Renal Function Impairment (Acute Kidney Injury/Impairment)  Goal: Effective Renal Function  Outcome: Ongoing, Progressing

## 2024-02-01 NOTE — SUBJECTIVE & OBJECTIVE
Interval History: see hospital course     Review of Systems   Constitutional: Negative.    Respiratory: Negative.     Cardiovascular: Negative.    Gastrointestinal: Negative.    Genitourinary: Negative.    Musculoskeletal: Negative.    Neurological:  Positive for headaches.   Psychiatric/Behavioral: Negative.       Objective:     Vital Signs (Most Recent):  Temp: 97.7 °F (36.5 °C) (01/31/24 2245)  Pulse: 65 (01/31/24 2245)  Resp: 17 (01/31/24 2245)  BP: (!) 149/83 (01/31/24 2245)  SpO2: 95 % (01/31/24 2245) Vital Signs (24h Range):  Temp:  [97.1 °F (36.2 °C)-98.3 °F (36.8 °C)] 97.7 °F (36.5 °C)  Pulse:  [64-73] 65  Resp:  [16-18] 17  SpO2:  [95 %-100 %] 95 %  BP: (110-149)/(64-91) 149/83     Weight: 116.3 kg (256 lb 6.4 oz)  Body mass index is 40.16 kg/m².    Intake/Output Summary (Last 24 hours) at 1/31/2024 2347  Last data filed at 1/31/2024 2315  Gross per 24 hour   Intake 240 ml   Output --   Net 240 ml           Physical Exam  Constitutional:       Appearance: Normal appearance.   Eyes:      Extraocular Movements: Extraocular movements intact.      Pupils: Pupils are equal, round, and reactive to light.   Cardiovascular:      Rate and Rhythm: Normal rate and regular rhythm.      Heart sounds: No murmur heard.  Pulmonary:      Effort: Pulmonary effort is normal.      Breath sounds: No wheezing or rales.   Abdominal:      General: Bowel sounds are normal. There is no distension.   Musculoskeletal:         General: Normal range of motion.      Cervical back: Normal range of motion and neck supple.   Skin:     General: Skin is warm and dry.      Capillary Refill: Capillary refill takes less than 2 seconds.   Neurological:      General: No focal deficit present.      Mental Status: She is alert and oriented to person, place, and time.   Psychiatric:         Mood and Affect: Mood normal.         Behavior: Behavior normal.             Significant Labs: All pertinent labs within the past 24 hours have been  reviewed.  Recent Lab Results         01/31/24  1850   01/31/24  1821   01/31/24  1746        Cortisol 11.8  Comment: Cortisol Reference Range:  Before 10:00 am: 4.46-22.7 ug/dL  After 5:00 pm:    1.7-14.1 ug/dL     8.5  Comment: Cortisol Reference Range:  Before 10:00 am: 4.46-22.7 ug/dL  After 5:00 pm:    1.7-14.1 ug/dL     1.0  Comment: Cortisol Reference Range:  Before 10:00 am: 4.46-22.7 ug/dL  After 5:00 pm:    1.7-14.1 ug/dL                 Significant Imaging: I have reviewed all pertinent imaging results/findings within the past 24 hours.

## 2024-02-01 NOTE — PROGRESS NOTES
[facility] Medicine  Progress Note    Patient Name: Emily Bryant  MRN: [id number]  Patient Class: IP- Inpatient   Admission Date: 1/27/2024  Length of Stay: 2 days  Attending Physician: Lazara Devine MD  Primary Care Provider: Tim Burton MD        Subjective:     Principal Problem:Syncope        HPI:  Mrs. Bryant is a 53-years-old female who presented to the ED with complaints of syncope.  Collateral history obtained from family member present at bedside.  Patient states she was in her usual state of health, sitting at a table, playing cards with family, had consumed 3 alcoholic beverages.  Sudden onset of feeling unwell, dizzy sensation described as room spinning, nauseous, diaphoresis, family member stated she subsequently was not verbalizing for about 30 seconds, then slumped over, body became flaccid, episode lasted about 20 seconds before she became responsive, generalized weakness continued for about 1 minute before she returned to baseline.  She denies any preceding chest pain, shortness of breath, focal weakness of extremities, associated incontinence or seizure activity.  No similar episodes in the past.  States has been experiencing congestion over the last few days but denies any fever or chills.  No diarrhea, cystitis symptoms, lower extremity swelling or calf tenderness. Following this episode family member checked vitals, reported SBP in 90s and heart rate in 60s. Patient states potassium has been running low over the last few months, she is on hydrochlorothiazide for blood pressure control.  In the ED blood pressure borderline 94/54, heart rate in 70s, afebrile, on room air.  Labs with WBC 11, hemoglobin 12.8, sodium 135, potassium 2.9, magnesium 1.6, BUN/creatinine 17/1.5, lactic acid 2.5, glucose 155, troponin 3.5.  EKG sinus rhythm with first-degree with T-wave inversion V1/V2.  She received IV fluid, 40 mEq oral potassium and 2 g IV magnesium  supplementation in the ED.  Case discussed with ED provider who requested admission for high-risk syncope.  Plan of care discussed with patient and family member at bedside.    Overview/Hospital Course:  Patient was admitted with near syncope vs TIA-like symptoms.  showed a video of her with family when she seemed to have a brief period of dissociation to speech and awareness. She became weak with dizziness, nausea, spinning of the room, and slumping over the torso. No precipitating angina, visual changes, SOB, fever or chills. At the time of incident she was playing cards with family and had only small amount of alcohol intake, In ED she was found to be hypotensive, hypokalemic, hyponatremic, hypomagnesemia, elevated lactate 2.5, 1st degree AV block on bystolic with HR in 70s and O2 sats 985 on RA.   THere was no head CT on admit. EKG showed T wave inversion on V1/V2. Normal trponin x 2. Blood pressure Normal range though holding home medications. After discussion with patient and watching video, will hold discharge today. Plan for NST in am. ECHo reviewed. EF 55-60%. No valve disease. No diastolic dysfunction. Head and neck CTA. Plan for holter monitor on dc home and follow up cardiology. Check thyroid levels, HA1c, B12 and orthostatics.   No large vessel disease on head neck CTA. Noted abnormal NST today. Patient describes intermittent mild chest discomfort over last several months. Cardiology consulted and would like neurology to see before [facility] takes place.   Neurology completed eval with brain MRI and no acute findings. Cleared for angiogram. [facility]  britney labs prior to this admission that were concern for adrenal insufficiency. Cortisol <3. We did the cosyntropin test and she did not respond appropriately. 60 min cortisol level was 11, 30 min was 8.  She will receive 100mg hydrocortisone IV prior to angiogram as instructed by endocrinologist.     Interval History: see hospital  course     Review of Systems   Constitutional: Negative.    Respiratory: Negative.     Cardiovascular: Negative.    Gastrointestinal: Negative.    Genitourinary: Negative.    Musculoskeletal: Negative.    Neurological:  Positive for headaches.   Psychiatric/Behavioral: Negative.       Objective:     Vital Signs (Most Recent):  Temp: 97.7 °F (36.5 °C) (01/31/24 2245)  Pulse: 65 (01/31/24 2245)  Resp: 17 (01/31/24 2245)  BP: (!) 149/83 (01/31/24 2245)  SpO2: 95 % (01/31/24 2245) Vital Signs (24h Range):  Temp:  [97.1 °F (36.2 °C)-98.3 °F (36.8 °C)] 97.7 °F (36.5 °C)  Pulse:  [64-73] 65  Resp:  [16-18] 17  SpO2:  [95 %-100 %] 95 %  BP: (110-149)/(64-91) 149/83     Weight: 116.3 kg (256 lb 6.4 oz)  Body mass index is 40.16 kg/m².    Intake/Output Summary (Last 24 hours) at 1/31/2024 2347  Last data filed at 1/31/2024 2315  Gross per 24 hour   Intake 240 ml   Output --   Net 240 ml           Physical Exam  Constitutional:       Appearance: Normal appearance.   Eyes:      Extraocular Movements: Extraocular movements intact.      Pupils: Pupils are equal, round, and reactive to light.   Cardiovascular:      Rate and Rhythm: Normal rate and regular rhythm.      Heart sounds: No murmur heard.  Pulmonary:      Effort: Pulmonary effort is normal.      Breath sounds: No wheezing or rales.   Abdominal:      General: Bowel sounds are normal. There is no distension.   Musculoskeletal:         General: Normal range of motion.      Cervical back: Normal range of motion and neck supple.   Skin:     General: Skin is warm and dry.      Capillary Refill: Capillary refill takes less than 2 seconds.   Neurological:      General: No focal deficit present.      Mental Status: She is alert and oriented to person, place, and time.   Psychiatric:         Mood and Affect: Mood normal.         Behavior: Behavior normal.             Significant Labs: All pertinent labs within the past 24 hours have been reviewed.  Recent Lab Results          01/31/24  1850   01/31/24  1821   01/31/24  1746        Cortisol 11.8  Comment: Cortisol Reference Range:  Before 10:00 am: 4.46-22.7 ug/dL  After 5:00 pm:    1.7-14.1 ug/dL     8.5  Comment: Cortisol Reference Range:  Before 10:00 am: 4.46-22.7 ug/dL  After 5:00 pm:    1.7-14.1 ug/dL     1.0  Comment: Cortisol Reference Range:  Before 10:00 am: 4.46-22.7 ug/dL  After 5:00 pm:    1.7-14.1 ug/dL                 Significant Imaging: I have reviewed all pertinent imaging results/findings within the past 24 hours.    Assessment/Plan:      * Syncope  Incomplete workup on admit  Head and neck CTA - no LVO  ?TIA- neuro consulted   Aspirin daily   Holding beta blocker as she was in 1st degree AVblock on admit  NST- abnormal - cardiology consulted  Ok to proceed with angiogram - d/w Dr Peguero - endocrinology  Continue tele monitoring  Bmp and mg  Orthostatics  Hold BP meds for SBP<100 and HR <60  New evidence of low cortisol levels raise question of adrenal insuff     Lactic acidosis  Likely due to hypotension       Neuropathy    Continue gabapentin     GERD (gastroesophageal reflux disease)    Pepcid qhs  Ok to bring home prilosec meds to take     Hypertension  Chronic, controlled. Latest blood pressure and vitals reviewed-     Temp:  [97.5 °F (36.4 °C)-98.2 °F (36.8 °C)]   Pulse:  [58-71]   Resp:  [16-18]   BP: (116-135)/(57-89)   SpO2:  [96 %-99 %] .   Home meds for hypertension were reviewed and noted below.   Hypertension Medications               hydroCHLOROthiazide (HYDRODIURIL) 25 MG tablet Take 25 mg by mouth once daily.    nebivoloL (BYSTOLIC) 10 MG Tab Take 10 mg by mouth every evening.    valsartan (DIOVAN) 80 MG tablet Take 80 mg by mouth once daily.            While in the hospital, will manage blood pressure as follows; Adjust home antihypertensive regimen as follows- hold home meds     Will utilize p.r.n. blood pressure medication only if patient's blood pressure greater than 180/110 and she develops  symptoms such as worsening chest pain or shortness of breath.    Obesity  Body mass index is 40.16 kg/m². Morbid obesity complicates all aspects of disease management from diagnostic modalities to treatment. Weight loss encouraged and health benefits explained to patient.   Patient plans to follow up for weight loss medication        Hypokalemia  Patient has hypokalemia which is Acute and currently uncontrolled. Most recent potassium levels reviewed-   Lab Results   Component Value Date    K 3.9 01/29/2024   . Will continue potassium replacement per protocol and recheck repeat levels after replacement completed.     Hold HCTZ    JO (acute kidney injury)  Patient with acute kidney injury/acute renal failure likely due to pre-renal azotemia due to dehydration JO is currently improving. Baseline creatinine  1.0  - Labs reviewed- Renal function/electrolytes with Estimated Creatinine Clearance: 109.7 mL/min (based on SCr of 0.8 mg/dL). according to latest data. Monitor urine output and serial BMP and adjust therapy as needed. Avoid nephrotoxins and renally dose meds for GFR listed above.    Resolved  Hold diuretics       VTE Risk Mitigation (From admission, onward)           Ordered     enoxaparin injection 40 mg  Every 12 hours         01/29/24 1010     Place sequential compression device  Until discontinued         01/28/24 0026     IP VTE HIGH RISK PATIENT  Once         01/28/24 0026                    Discharge Planning   KRISTIE:      Code Status: Full Code   Is the patient medically ready for discharge?:     Reason for patient still in hospital (select all that apply): Patient trending condition and Consult recommendations  Discharge Plan A: Home with family                  Lazara Devine MD  Department of Hospital Medicine   [facility]

## 2024-02-01 NOTE — SUBJECTIVE & OBJECTIVE
Interval History: Negative cardiac cath    Review of Systems   Constitutional:  Positive for activity change, diaphoresis and fatigue.   HENT: Negative.     Eyes: Negative.    Respiratory: Negative.     Cardiovascular: Negative.    Gastrointestinal: Negative.    Endocrine: Negative.    Genitourinary: Negative.    Musculoskeletal:  Positive for arthralgias.   Skin: Negative.    Allergic/Immunologic: Negative.    Neurological:  Positive for weakness.   Hematological: Negative.    Psychiatric/Behavioral:  The patient is nervous/anxious.      Objective:     Vital Signs (Most Recent):  Temp: 97.5 °F (36.4 °C) (02/01/24 0753)  Pulse: 64 (02/01/24 1737)  Resp: 16 (02/01/24 1600)  BP: (!) 138/91 (02/01/24 1737)  SpO2: 99 % (02/01/24 1737) Vital Signs (24h Range):  Temp:  [97.3 °F (36.3 °C)-97.9 °F (36.6 °C)] 97.5 °F (36.4 °C)  Pulse:  [62-83] 64  Resp:  [16-17] 16  SpO2:  [95 %-99 %] 99 %  BP: (127-155)/(61-91) 138/91     Weight: 116.3 kg (256 lb 6.4 oz)  Body mass index is 40.16 kg/m².    Intake/Output Summary (Last 24 hours) at 2/1/2024 1750  Last data filed at 2/1/2024 0755  Gross per 24 hour   Intake 240 ml   Output 400 ml   Net -160 ml         Physical Exam  Vitals and nursing note reviewed.   Constitutional:       General: She is not in acute distress.  HENT:      Head: Normocephalic and atraumatic.      Nose: Nose normal.      Mouth/Throat:      Mouth: Mucous membranes are moist.   Eyes:      Extraocular Movements: Extraocular movements intact.      Pupils: Pupils are equal, round, and reactive to light.   Cardiovascular:      Rate and Rhythm: Normal rate and regular rhythm.   Pulmonary:      Effort: Pulmonary effort is normal.      Breath sounds: Normal breath sounds.   Abdominal:      General: Bowel sounds are normal.   Musculoskeletal:         General: Normal range of motion.      Cervical back: Normal range of motion and neck supple.   Skin:     General: Skin is warm.   Neurological:      Mental Status: She is  alert and oriented to person, place, and time.   Psychiatric:         Mood and Affect: Mood normal.             Significant Labs: All pertinent labs within the past 24 hours have been reviewed.  Recent Lab Results         01/31/24  1850        Cortisol 11.8  Comment: Cortisol Reference Range:  Before 10:00 am: 4.46-22.7 ug/dL  After 5:00 pm:    1.7-14.1 ug/dL                 Significant Imaging: I have reviewed all pertinent imaging results/findings within the past 24 hours.

## 2024-02-01 NOTE — NURSING
Patient transfer 2524 (Cardio-B). Report given to DONNIE Mercer. Condition stable at time of discharge. Spouse at bedside.

## 2024-02-01 NOTE — PROCEDURES
EEG REPORT    NAME: Emily Bryant  : 1970  MRN: [id number]    DATE of EE2024    CLINICAL INDICATION: This is a 53 y.o. female with chronic pain, history of multiple steroid injections, being evaluated for syncope vs seizure.    MEDICATIONS:  Scheduled Meds:   aspirin  81 mg Oral Daily    atorvastatin  40 mg Oral QHS    enoxparin  40 mg Subcutaneous Q12H (prophylaxis, 0900/2100)    ezetimibe  10 mg Oral Daily    gabapentin  300 mg Oral BID    pantoprazole  40 mg Oral Before breakfast     Continuous Infusions:  PRN Meds:.acetaminophen, calcium carbonate, dextrose 50%, dextrose 50%, glucagon (human recombinant), glucose, glucose, LIDOcaine (PF) 10 mg/ml (1%), magnesium oxide, magnesium oxide, naloxone, ondansetron, potassium bicarbonate, potassium bicarbonate, potassium bicarbonate, potassium, sodium phosphates, potassium, sodium phosphates, potassium, sodium phosphates, sodium chloride 0.9%, traMADoL    EEG DESCRIPTION:  A 16-channel EEG was performed with electrode placement in 10/20 International System. Longitudinal bipolar and referential montages were utilized in analysis. Total duration of this study was 25 minutes.    This is a technically adequate study with minor muscle and motion artifacts.    The dominant posterior background rhythm was a well modulated, symmetric, synchronous, reactive, 9-10 Hz rhythm.    The record was reactive to eye opening and closure. Anterior derivations showed the expected admixture of low-voltage beta and theta frequencies as well as intermittent eye blink artifact.    Drowsiness was characterized by voltage attenuation and lateral eye movements.    No sleep was seen in this study.    Photic stimulation and hyperventilation were not performed.    No epileptiform discharges were recorded. No electrographic or electroclinical seizures were recorded.    INTERPRETATION:  This is a normal EEG recorded during wakefulness and drowsiness. No epileptiform discharges,  electrographic or electroclinical seizures were recorded.     There was limited recording of the drowsy and sleeping states. If clinically indicated, a repeat study following sleep deprivation may provide additional relevant, electrographic detail regarding the drowsy and sleeping states.      Allison Hoffmann MD  Neurology     Quality 130: Documentation Of Current Medications In The Medical Record: Current Medications Documented Detail Level: Detailed Quality 226: Preventive Care And Screening: Tobacco Use: Screening And Cessation Intervention: Patient screened for tobacco use and is an ex/non-smoker Quality 431: Preventive Care And Screening: Unhealthy Alcohol Use - Screening: Patient screened for unhealthy alcohol use using a single question and scores less than 2 times per year

## 2024-02-01 NOTE — ASSESSMENT & PLAN NOTE
Incomplete workup on admit  Head and neck CTA - no LVO  ?TIA- neuro consulted   Aspirin daily   Holding beta blocker as she was in 1st degree AVblock on admit  NST- abnormal - cardiology consulted  Ok to proceed with angiogram - d/w Dr Peguero - endocrinology  Continue tele monitoring  Bmp and mg  Orthostatics  Hold BP meds for SBP<100 and HR <60  New evidence of low cortisol levels raise question of adrenal insuff

## 2024-02-01 NOTE — NURSING
Nurses Note -- 4 Eyes      1/31/2024   11:04 PM      Skin assessed during: Transfer      [x] No Altered Skin Integrity Present    [x]Prevention Measures Documented      [] Yes- Altered Skin Integrity Present or Discovered   [] LDA Added if Not in Epic (Describe Wound)   [] New Altered Skin Integrity was Present on Admit and Documented in LDA   [] Wound Image Taken    Wound Care Consulted? No    Attending Nurse: [id number]    Second RN/Staff Member:   [id number]

## 2024-02-02 NOTE — DISCHARGE INSTRUCTIONS
Post Radial Cath Discharge Instructions  Keep puncture site covered with current bandage for 24 hours.  You may shower in 24 hours. Do not take a tub bath or submerge the puncture site in water for 72 hours.   You may cover the site with a Band-Aid. Keep Band-Aid clean and dry at all times.    No lifting over 5 pounds with the arm you puncture site is on for 72 hours.  No strenuous activity such as bowling, tennis, etc for 72 hours  Do not operate lawnmower, motorcycle, chainsaw, or all terrain vehicle for 72 hours.  No blood pressure or tourniquets on affected arm for 72 hours.   The puncture site may be slightly bruised and sore following your procedure.   Drink 6-8 glasses of clear liquids during the next 24 hours to flush the dye out.     If Bleeding Occurs, DO NOT PANIC  Place 1 or 2 fingers over the puncture site and hold firm pressure to stop bleeding. You may be able to feel your pulse as you hold pressure  Lift you fingers after 5 minutes to see if the bleeding stopped.  Once has stopped, gently wipe the wrist area clean and cover with a bandage.  If the bleeding does not stop after 30 minutes, or if there is a large amount of bleeding or spurting, call 911! Do not drive yourself to the hospital.     Should any of the following occur, Contact your Physician Immediately:  Redness, inflamation, swelling, chills or fever, or discolred drainage at procedure site   Coldness, discoloration, ongoing numbness, severe pain, or swelling. Expect mild tingling of hand and tenderness at the puncture site for up to 3 days. If this persists or other symptoms develop, notify your physician

## 2024-02-02 NOTE — DISCHARGE SUMMARY
[facility]  Discharge Summary      Patient Name: Emily Bryant  MRN: [id number]  [facility]: [id number]  Patient Class: IP- Inpatient  Admission Date: 1/27/2024  Hospital Length of Stay: 3 days  Discharge Date and Time:  02/01/2024 6:28 PM  Attending Physician: Samuel Walker MD   Discharging Provider: Samuel Walker MD  Primary Care Provider: Tim Burton MD    Primary Care Team: Networked reference to record PCT     HPI:   Mrs. Bryant is a 53-years-old female who presented to the ED with complaints of syncope.  Collateral history obtained from family member present at bedside.  Patient states she was in her usual state of health, sitting at a table, playing cards with family, had consumed 3 alcoholic beverages.  Sudden onset of feeling unwell, dizzy sensation described as room spinning, nauseous, diaphoresis, family member stated she subsequently was not verbalizing for about 30 seconds, then slumped over, body became flaccid, episode lasted about 20 seconds before she became responsive, generalized weakness continued for about 1 minute before she returned to baseline.  She denies any preceding chest pain, shortness of breath, focal weakness of extremities, associated incontinence or seizure activity.  No similar episodes in the past.  States has been experiencing congestion over the last few days but denies any fever or chills.  No diarrhea, cystitis symptoms, lower extremity swelling or calf tenderness. Following this episode family member checked vitals, reported SBP in 90s and heart rate in 60s. Patient states potassium has been running low over the last few months, she is on hydrochlorothiazide for blood pressure control.  In the ED blood pressure borderline 94/54, heart rate in 70s, afebrile, on room air.  Labs with WBC 11, hemoglobin 12.8, sodium 135, potassium 2.9, magnesium 1.6, BUN/creatinine 17/1.5, lactic acid 2.5, glucose 155, troponin 3.5.  EKG sinus rhythm  with first-degree with T-wave inversion V1/V2.  She received IV fluid, 40 mEq oral potassium and 2 g IV magnesium supplementation in the ED.  Case discussed with ED provider who requested admission for high-risk syncope.  Plan of care discussed with patient and family member at bedside.    Procedure(s) (LRB):  Left heart cath (Left)      Hospital Course:   Patient was admitted with near syncope vs TIA-like symptoms.  showed a video of her with family when she seemed to have a brief period of dissociation to speech and awareness. She became weak with dizziness, nausea, spinning of the room, and slumping over the torso. No precipitating angina, visual changes, SOB, fever or chills. At the time of incident she was playing cards with family and had only small amount of alcohol intake, In ED she was found to be hypotensive, hypokalemic, hyponatremic, hypomagnesemia, elevated lactate 2.5, 1st degree AV block on bystolic with HR in 70s and O2 sats 985 on RA.   THere was no head CT on admit. EKG showed T wave inversion on V1/V2. Normal trponin x 2. Blood pressure Normal range though holding home medications. After discussion with patient and watching video, will hold discharge today. Plan for NST in am. ECHo reviewed. EF 55-60%. No valve disease. No diastolic dysfunction. Head and neck CTA. Plan for holter monitor on dc home and follow up cardiology. Check thyroid levels, HA1c, B12 and orthostatics.   No large vessel disease on head neck CTA. Noted abnormal NST today. Patient describes intermittent mild chest discomfort over last several months. Cardiology consulted and would like neurology to see before [facility] takes place.   Neurology completed eval with brain MRI and no acute findings. Cleared for angiogram. [facility]  britney labs prior to this admission that were concern for adrenal insufficiency. Cortisol <3. We did the cosyntropin test and she did not respond appropriately. 60 min cortisol  level was 11, 30 min was 8.  She will receive 100mg hydrocortisone IV prior to angiogram as instructed by endocrinologist.     2/1/2024  Ms Bryant has no new complaints and has had a negative cardiac cath per Dr Castellanos.   No indication for further work per Dr Castellanos or Dr Jacques Martinez  Pt strongly desires to go home.  ROS see above otherwise negative 10/10 systems  PE : in no distress HEENT CHATO EOM intact moist mucus membranes Neck supple no use of accessory muscles Lungs no crackles wheezes or rhonchi Heart S 1 S 2 RRR no murmur Abdomen BS+ nontender Ext without CC or E pulses 1-2+ Skin no acute finding Neuro no acute sensory or motor deficit     Goals of Care Treatment Preferences:  Code Status: Full Code      Consults:   Consults (From admission, onward)          Status Ordering Provider     Inpatient consult to Neurology  Once        Provider:  Allison Mata MD    Completed COLIN AMADOR     Inpatient consult to Cardiology  Once        Provider:  Orion Castellanos MD    Completed COLIN AMADOR     Inpatient consult to Hospitalist  Once        Provider:  Aaliyah Perrin MD Acknowledged BREDY, SANCIA            No new Assessment & Plan notes have been filed under this hospital service since the last note was generated.  Service: Hospital Medicine    Final Active Diagnoses:    Diagnosis Date Noted POA    PRINCIPAL PROBLEM:  Syncope [R55] 01/28/2024 Yes    JO (acute kidney injury) [N17.9] 01/28/2024 Yes    Hypokalemia [E87.6] 01/28/2024 Yes    Obesity [E66.9] 01/28/2024 Yes     Chronic    Hypertension [I10] 01/28/2024 Yes     Chronic    GERD (gastroesophageal reflux disease) [K21.9] 01/28/2024 Yes     Chronic    Neuropathy [G62.9] 01/28/2024 Yes     Chronic    Lactic acidosis [E87.20] 01/28/2024 Yes      Problems Resolved During this Admission:       Discharged Condition: good    Disposition: Home or Self Care    Follow Up:   Follow-up Information       Tim Burton MD Follow up.   "  Specialty: Internal Medicine  Why: left message at clinic for scheduling  requested they contact pt to schedule a hospital follow up  Contact information:  [address]  [address]  [place] [address]  [phone]               David Peguero MD Follow up in 1 week(s).    Specialty: Endocrinology  Contact information:  [address]  [address]  [place] [address]  [phone]               Allison Mata MD Follow up in 1 week(s).    Specialty: Neurology  Contact information:  [address]  [place] [address]  [phone]                           Patient Instructions:      Diet Cardiac     Activity as tolerated       Significant Diagnostic Studies: Labs: BMP: No results for input(s): "GLU", "NA", "K", "CL", "CO2", "BUN", "CREATININE", "CALCIUM", "MG" in the last 48 hours., CMP No results for input(s): "NA", "K", "CL", "CO2", "GLU", "BUN", "CREATININE", "CALCIUM", "PROT", "ALBUMIN", "BILITOT", "ALKPHOS", "AST", "ALT", "ANIONGAP", "ESTGFRAFRICA", "EGFRNONAA" in the last 48 hours., CBC No results for input(s): "WBC", "HGB", "HCT", "PLT" in the last 48 hours., INR No results found for: "INR", "PROTIME", Troponin No results for input(s): "TROPONINI" in the last 168 hours., and All labs within the past 24 hours have been reviewed  Microbiology: Blood Culture No results found for: "LABBLOO" and Urine Culture  No results found for: "LABURIN"    Pending Diagnostic Studies:       Procedure Component Value Units Date/Time    ACTH [[id number]] Collected: 01/31/24 1746    Order Status: Sent Lab Status: In process Updated: 01/31/24 1810    Specimen: Blood            Medications:  Reconciled Home Medications:      Medication List        START taking these medications      aspirin 81 MG EC tablet  Commonly known as: ECOTRIN  Take 1 tablet (81 mg total) by mouth once daily.  Start taking on: February 2, 2024     predniSONE 5 MG tablet  Commonly known as: DELTASONE  Take 1 tablet (5 mg total) by mouth once " daily.            CONTINUE taking these medications      atorvastatin 40 MG tablet  Commonly known as: LIPITOR  Take 40 mg by mouth every morning.     ezetimibe 10 mg tablet  Commonly known as: ZETIA  Take 10 mg by mouth once daily.     famotidine 20 MG tablet  Commonly known as: PEPCID  Take 20 mg by mouth every evening.     gabapentin 300 MG capsule  Commonly known as: NEURONTIN  Take 300 mg by mouth 2 (two) times a day. Takes 1500 and 2100     omeprazole 20 MG capsule  Commonly known as: PRILOSEC  Take 20 mg by mouth 2 (two) times a day.     vitamin D 1000 units Tab  Commonly known as: VITAMIN D3  Take 1,000 Units by mouth once daily.            STOP taking these medications      hydroCHLOROthiazide 25 MG tablet  Commonly known as: HYDRODIURIL     nebivoloL 10 MG Tab  Commonly known as: BYSTOLIC     traMADoL 50 mg tablet  Commonly known as: ULTRAM     valsartan 80 MG tablet  Commonly known as: DIOVAN              Indwelling Lines/Drains at time of discharge:   Lines/Drains/Airways       None                   Time spent on the discharge of patient: 36 minutes         Samuel Walker MD  Department of Hospital Medicine  [facility]

## 2024-02-02 NOTE — PROGRESS NOTES
[facility]  Progress Note    Patient Name: Emily Bryant  MRN: [id number]  Patient Class: IP- Inpatient   Admission Date: 1/27/2024  Length of Stay: 3 days  Attending Physician: Samuel Walker MD  Primary Care Provider: Tim Burton MD        Subjective:     Principal Problem:Syncope        HPI:  Mrs. Bryant is a 53-years-old female who presented to the ED with complaints of syncope.  Collateral history obtained from family member present at bedside.  Patient states she was in her usual state of health, sitting at a table, playing cards with family, had consumed 3 alcoholic beverages.  Sudden onset of feeling unwell, dizzy sensation described as room spinning, nauseous, diaphoresis, family member stated she subsequently was not verbalizing for about 30 seconds, then slumped over, body became flaccid, episode lasted about 20 seconds before she became responsive, generalized weakness continued for about 1 minute before she returned to baseline.  She denies any preceding chest pain, shortness of breath, focal weakness of extremities, associated incontinence or seizure activity.  No similar episodes in the past.  States has been experiencing congestion over the last few days but denies any fever or chills.  No diarrhea, cystitis symptoms, lower extremity swelling or calf tenderness. Following this episode family member checked vitals, reported SBP in 90s and heart rate in 60s. Patient states potassium has been running low over the last few months, she is on hydrochlorothiazide for blood pressure control.  In the ED blood pressure borderline 94/54, heart rate in 70s, afebrile, on room air.  Labs with WBC 11, hemoglobin 12.8, sodium 135, potassium 2.9, magnesium 1.6, BUN/creatinine 17/1.5, lactic acid 2.5, glucose 155, troponin 3.5.  EKG sinus rhythm with first-degree with T-wave inversion V1/V2.  She received IV fluid, 40 mEq oral potassium and 2 g IV magnesium  supplementation in the ED.  Case discussed with ED provider who requested admission for high-risk syncope.  Plan of care discussed with patient and family member at bedside.    Overview/Hospital Course:  Patient was admitted with near syncope vs TIA-like symptoms.  showed a video of her with family when she seemed to have a brief period of dissociation to speech and awareness. She became weak with dizziness, nausea, spinning of the room, and slumping over the torso. No precipitating angina, visual changes, SOB, fever or chills. At the time of incident she was playing cards with family and had only small amount of alcohol intake, In ED she was found to be hypotensive, hypokalemic, hyponatremic, hypomagnesemia, elevated lactate 2.5, 1st degree AV block on bystolic with HR in 70s and O2 sats 985 on RA.   THere was no head CT on admit. EKG showed T wave inversion on V1/V2. Normal trponin x 2. Blood pressure Normal range though holding home medications. After discussion with patient and watching video, will hold discharge today. Plan for NST in am. ECHo reviewed. EF 55-60%. No valve disease. No diastolic dysfunction. Head and neck CTA. Plan for holter monitor on dc home and follow up cardiology. Check thyroid levels, HA1c, B12 and orthostatics.   No large vessel disease on head neck CTA. Noted abnormal NST today. Patient describes intermittent mild chest discomfort over last several months. Cardiology consulted and would like neurology to see before C takes place.   Neurology completed eval with brain MRI and no acute findings. Cleared for angiogram. [facility]  britney labs prior to this admission that were concern for adrenal insufficiency. Cortisol <3. We did the cosyntropin test and she did not respond appropriately. 60 min cortisol level was 11, 30 min was 8.  She will receive 100mg hydrocortisone IV prior to angiogram as instructed by endocrinologist.     2/1/2024  Ms Bryant has no new  complaints and has had a negative cardiac cath per Dr Castellanos.   No indication for further work per Dr Castellanos or Dr Jacques Martinez  Pt strongly desires to go home.  ROS see above otherwise negative 10/10 systems  PE : in no distress HEENT CHATO EOM intact moist mucus membranes Neck supple no use of accessory muscles Lungs no crackles wheezes or rhonchi Heart S 1 S 2 RRR no murmur Abdomen BS+ nontender Ext without CC or E pulses 1-2+ Skin no acute finding Neuro no acute sensory or motor deficit    Interval History: Negative cardiac cath    Review of Systems   Constitutional:  Positive for activity change, diaphoresis and fatigue.   HENT: Negative.     Eyes: Negative.    Respiratory: Negative.     Cardiovascular: Negative.    Gastrointestinal: Negative.    Endocrine: Negative.    Genitourinary: Negative.    Musculoskeletal:  Positive for arthralgias.   Skin: Negative.    Allergic/Immunologic: Negative.    Neurological:  Positive for weakness.   Hematological: Negative.    Psychiatric/Behavioral:  The patient is nervous/anxious.      Objective:     Vital Signs (Most Recent):  Temp: 97.5 °F (36.4 °C) (02/01/24 0753)  Pulse: 64 (02/01/24 1737)  Resp: 16 (02/01/24 1600)  BP: (!) 138/91 (02/01/24 1737)  SpO2: 99 % (02/01/24 1737) Vital Signs (24h Range):  Temp:  [97.3 °F (36.3 °C)-97.9 °F (36.6 °C)] 97.5 °F (36.4 °C)  Pulse:  [62-83] 64  Resp:  [16-17] 16  SpO2:  [95 %-99 %] 99 %  BP: (127-155)/(61-91) 138/91     Weight: 116.3 kg (256 lb 6.4 oz)  Body mass index is 40.16 kg/m².    Intake/Output Summary (Last 24 hours) at 2/1/2024 1750  Last data filed at 2/1/2024 0755  Gross per 24 hour   Intake 240 ml   Output 400 ml   Net -160 ml         Physical Exam  Vitals and nursing note reviewed.   Constitutional:       General: She is not in acute distress.  HENT:      Head: Normocephalic and atraumatic.      Nose: Nose normal.      Mouth/Throat:      Mouth: Mucous membranes are moist.   Eyes:      Extraocular Movements: Extraocular  movements intact.      Pupils: Pupils are equal, round, and reactive to light.   Cardiovascular:      Rate and Rhythm: Normal rate and regular rhythm.   Pulmonary:      Effort: Pulmonary effort is normal.      Breath sounds: Normal breath sounds.   Abdominal:      General: Bowel sounds are normal.   Musculoskeletal:         General: Normal range of motion.      Cervical back: Normal range of motion and neck supple.   Skin:     General: Skin is warm.   Neurological:      Mental Status: She is alert and oriented to person, place, and time.   Psychiatric:         Mood and Affect: Mood normal.             Significant Labs: All pertinent labs within the past 24 hours have been reviewed.  Recent Lab Results         01/31/24  1850        Cortisol 11.8  Comment: Cortisol Reference Range:  Before 10:00 am: 4.46-22.7 ug/dL  After 5:00 pm:    1.7-14.1 ug/dL                 Significant Imaging: I have reviewed all pertinent imaging results/findings within the past 24 hours.    Assessment/Plan:      * Syncope  Incomplete workup on admit  Head and neck CTA - no LVO  ?TIA- neuro consulted   Aspirin daily   Holding beta blocker as she was in 1st degree AVblock on admit  NST- abnormal - cardiology consulted  Ok to proceed with angiogram - d/w Dr Peguero - endocrinology  Continue tele monitoring  Bmp and mg  Orthostatics  Hold BP meds for SBP<100 and HR <60  New evidence of low cortisol levels raise question of adrenal insuff     Lactic acidosis  Likely due to hypotension       Neuropathy    Continue gabapentin     GERD (gastroesophageal reflux disease)    Pepcid qhs  Ok to bring home prilosec meds to take     Hypertension  Chronic, controlled. Latest blood pressure and vitals reviewed-     Temp:  [97.5 °F (36.4 °C)-98.2 °F (36.8 °C)]   Pulse:  [58-71]   Resp:  [16-18]   BP: (116-135)/(57-89)   SpO2:  [96 %-99 %] .   Home meds for hypertension were reviewed and noted below.   Hypertension Medications               hydroCHLOROthiazide  (HYDRODIURIL) 25 MG tablet Take 25 mg by mouth once daily.    nebivoloL (BYSTOLIC) 10 MG Tab Take 10 mg by mouth every evening.    valsartan (DIOVAN) 80 MG tablet Take 80 mg by mouth once daily.            While in the hospital, will manage blood pressure as follows; Adjust home antihypertensive regimen as follows- hold home meds     Will utilize p.r.n. blood pressure medication only if patient's blood pressure greater than 180/110 and she develops symptoms such as worsening chest pain or shortness of breath.    Obesity  Body mass index is 40.16 kg/m². Morbid obesity complicates all aspects of disease management from diagnostic modalities to treatment. Weight loss encouraged and health benefits explained to patient.   Patient plans to follow up for weight loss medication        Hypokalemia  Patient has hypokalemia which is Acute and currently uncontrolled. Most recent potassium levels reviewed-   Lab Results   Component Value Date    K 3.9 01/29/2024   . Will continue potassium replacement per protocol and recheck repeat levels after replacement completed.     Hold HCTZ    JO (acute kidney injury)  Patient with acute kidney injury/acute renal failure likely due to pre-renal azotemia due to dehydration JO is currently improving. Baseline creatinine  1.0  - Labs reviewed- Renal function/electrolytes with Estimated Creatinine Clearance: 109.7 mL/min (based on SCr of 0.8 mg/dL). according to latest data. Monitor urine output and serial BMP and adjust therapy as needed. Avoid nephrotoxins and renally dose meds for GFR listed above.    Resolved  Hold diuretics       VTE Risk Mitigation (From admission, onward)           Ordered     enoxaparin injection 40 mg  Every 12 hours         01/29/24 1010     Place sequential compression device  Until discontinued         01/28/24 0026     IP VTE HIGH RISK PATIENT  Once         01/28/24 0026                    Discharge Planning   KRISTIE: 2/1/2024     Code Status: Full Code   Is the  patient medically ready for discharge?:     Reason for patient still in hospital (select all that apply): Pending disposition  Discharge Plan A: Home with family                  Samuel Walker MD  Department of Hospital Medicine   [facility]

## 2024-02-03 LAB — ACTH PLAS-MCNC: 6.2 PG/ML (ref 7.2–63.3)

## 2024-02-14 ENCOUNTER — HOSPITAL ENCOUNTER (OUTPATIENT)
Dept: RADIOLOGY | Facility: HOSPITAL | Age: 54
Discharge: HOME | End: 2024-02-14
Attending: FAMILY MEDICINE
Payer: OTHER GOVERNMENT

## 2024-02-14 VITALS — HEIGHT: 67 IN | WEIGHT: 256.38 LBS | BODY MASS INDEX: 40.24 KG/M2

## 2024-02-14 DIAGNOSIS — Z12.31 ENCOUNTER FOR SCREENING MAMMOGRAM FOR MALIGNANT NEOPLASM OF BREAST: ICD-10-CM

## 2024-02-14 PROCEDURE — 77067 SCR MAMMO BI INCL CAD: CPT | Mod: TC,PO

## 2024-04-29 PROBLEM — N17.9 AKI (ACUTE KIDNEY INJURY): Status: RESOLVED | Noted: 2024-01-28 | Resolved: 2024-04-29

## 2024-07-08 RX ORDER — PREDNISONE 5 MG/1
5 TABLET ORAL 2 TIMES DAILY
COMMUNITY
End: 2024-07-08

## 2024-07-08 RX ORDER — AMLODIPINE BESYLATE 10 MG/1
5 TABLET ORAL DAILY
COMMUNITY

## 2024-07-08 RX ORDER — HYDROCORTISONE 10 MG/1
10 TABLET ORAL 2 TIMES DAILY
COMMUNITY

## 2024-07-08 RX ORDER — VALSARTAN 80 MG/1
80 TABLET ORAL 2 TIMES DAILY
COMMUNITY

## 2024-07-08 NOTE — PROGRESS NOTES
Subjective:       Patient ID: Emily Bryant is a 53 y.o. female Body mass index is 39.47 kg/m².    Chief Complaint: Gastroesophageal Reflux    This patient is new to me.  Referring Provider: [facility] for [facility]ER[facility].     Omeprazole 1x daily --> 2x daily for years. Added pepcid at night. Still having significant nightly GERD with aspiration episodes.         Gastroesophageal Reflux  She complains of abdominal pain, coughing, heartburn, nausea, a sore throat, water brash and wheezing. She reports no chest pain. aspiration. This is a chronic problem. The current episode started more than 1 year ago. The problem occurs constantly. The problem has been rapidly worsening. The symptoms are aggravated by lying down and certain foods. Pertinent negatives include no fatigue. She has tried a histamine-2 antagonist and a PPI for the symptoms. The treatment provided no relief.     Review of Systems   Constitutional:  Negative for activity change, appetite change, fatigue, fever and unexpected weight change.   HENT:  Positive for sore throat and trouble swallowing.    Respiratory:  Positive for cough, shortness of breath and wheezing.    Cardiovascular:  Negative for chest pain.   Gastrointestinal:  Positive for abdominal pain, heartburn and nausea. Negative for abdominal distention, anal bleeding, blood in stool, constipation, diarrhea, rectal pain and vomiting.       No LMP recorded. Patient is postmenopausal.  Past Medical History:   Diagnosis Date    Heartburn      Past Surgical History:   Procedure Laterality Date    ANTERIOR CRUCIATE LIGAMENT REPAIR      AUGMENTATION OF BREAST      BREAST SURGERY       SECTION      CHOLECYSTECTOMY      FOOT SURGERY      LEFT HEART CATHETERIZATION Left 2024    Procedure: Left heart cath;  Surgeon: Orion Castellanos MD;  Location: [facility] CATH/EP LAB;  Service: Cardiology;  Laterality: Left;    TONSILLECTOMY       Family History   Problem Relation Name Age of Onset     Heart disease Father       Social History     Tobacco Use    Smoking status: Former     Types: Cigarettes    Smokeless tobacco: Former    Tobacco comments:     Ex smoker 12 years   Substance Use Topics    Alcohol use: Yes     Comment: occassionally     Wt Readings from Last 10 Encounters:   07/09/24 114.3 kg (251 lb 15.8 oz)   02/14/24 116.3 kg (256 lb 6.3 oz)   01/31/24 116.3 kg (256 lb 6.4 oz)   01/28/24 113.4 kg (250 lb)   10/03/18 90.7 kg (200 lb)   06/13/18 86.2 kg (190 lb)     Lab Results   Component Value Date    WBC 8.35 01/28/2024    HGB 11.8 (L) 01/28/2024    HCT 35.2 (L) 01/28/2024    MCV 85 01/28/2024     01/28/2024     CMP  Sodium   Date Value Ref Range Status   01/29/2024 137 136 - 145 mmol/L Final     Potassium   Date Value Ref Range Status   01/29/2024 3.9 3.5 - 5.1 mmol/L Final     Chloride   Date Value Ref Range Status   01/29/2024 100 95 - 110 mmol/L Final     CO2   Date Value Ref Range Status   01/29/2024 28 23 - 29 mmol/L Final     Glucose   Date Value Ref Range Status   01/29/2024 109 70 - 110 mg/dL Final     BUN   Date Value Ref Range Status   01/29/2024 12 6 - 20 mg/dL Final     Creatinine   Date Value Ref Range Status   01/29/2024 0.8 0.5 - 1.4 mg/dL Final     Calcium   Date Value Ref Range Status   01/29/2024 9.3 8.7 - 10.5 mg/dL Final     Total Protein   Date Value Ref Range Status   01/27/2024 7.0 6.0 - 8.4 g/dL Final     Albumin   Date Value Ref Range Status   01/27/2024 4.0 3.5 - 5.2 g/dL Final     Total Bilirubin   Date Value Ref Range Status   01/27/2024 0.4 0.1 - 1.0 mg/dL Final     Comment:     For infants and newborns, interpretation of results should be based  on gestational age, weight and in agreement with clinical  observations.    Premature Infant recommended reference ranges:  Up to 24 hours.............<8.0 mg/dL  Up to 48 hours............<12.0 mg/dL  3-5 days..................<15.0 mg/dL  6-29 days.................<15.0 mg/dL       Alkaline Phosphatase   Date Value  "Ref Range Status   01/27/2024 81 55 - 135 U/L Final     AST   Date Value Ref Range Status   01/27/2024 16 10 - 40 U/L Final     ALT   Date Value Ref Range Status   01/27/2024 17 10 - 44 U/L Final     Anion Gap   Date Value Ref Range Status   01/29/2024 9 8 - 16 mmol/L Final     No results found for: "AMYLASE"  No results found for: "LIPASE"  No results found for: "LIPASERES"  Lab Results   Component Value Date    TSH 1.466 01/29/2024       Reviewed prior medical records including radiology report of no GI imaging & endoscopy history (see surgical history).    Objective:      Physical Exam  Vitals and nursing note reviewed.   Constitutional:       General: She is not in acute distress.     Appearance: She is obese. She is not ill-appearing.   HENT:      Head: Normocephalic and atraumatic.      Mouth/Throat:      Mouth: Mucous membranes are moist.      Pharynx: Oropharynx is clear.   Eyes:      Conjunctiva/sclera: Conjunctivae normal.   Cardiovascular:      Rate and Rhythm: Normal rate.      Pulses: Normal pulses.   Pulmonary:      Effort: Pulmonary effort is normal. No respiratory distress.   Abdominal:      General: Abdomen is flat. Bowel sounds are normal. There is no distension.      Palpations: Abdomen is soft.      Tenderness: There is no abdominal tenderness.   Skin:     General: Skin is warm and dry.      Capillary Refill: Capillary refill takes 2 to 3 seconds.   Neurological:      Mental Status: She is alert and oriented to person, place, and time.         Assessment:       1. Gastroesophageal reflux disease, unspecified whether esophagitis present    2. Aspiration into respiratory tract, initial encounter    3. Heartburn    4. Abi-Danlos disease    5. Wheezing        Plan:       Gastroesophageal reflux disease, unspecified whether esophagitis present, Aspiration into respiratory tract, initial encounter, Heartburn, Wheezing    -     sucralfate (CARAFATE) 1 gram tablet; Take 1 tablet (1 g total) by mouth 4 " (four) times daily before meals and nightly. for 10 days  Dispense: 40 tablet; Refill: 0  -     RABEprazole (ACIPHEX) 20 mg tablet; Take 1 tablet (20 mg total) by mouth once daily.  Dispense: 30 tablet; Refill: 11  -     RABEprazole (ACIPHEX) 20 mg tablet; Take 1 tablet (20 mg total) by mouth once daily.  Dispense: 30 tablet; Refill: 11    Abi-Danlos disease  [facility] for [facility] and [facility]    No follow-ups on file.      If no improvement in symptoms or symptoms worsen, call/follow-up at clinic or go to ER.       WENDY Dodson, SAM-C    Encounter includes face to face time and non-face to face time preparing to see the patient (eg, review of tests), obtaining and/or reviewing separately obtained history, documenting clinical information in the electronic or other health record, independently interpreting results (not separately reported) and communicating results to the patient/family/caregiver, or care coordination (not separately reported).     A dictation software program was used for this note. Please expect some simple typographical errors in this note.

## 2024-07-08 NOTE — H&P (VIEW-ONLY)
Subjective:       Patient ID: Emily Bryant is a 53 y.o. female Body mass index is 39.47 kg/m².    Chief Complaint: Gastroesophageal Reflux    This patient is new to me.  Referring Provider: [facility] for GERD.     Omeprazole 1x daily --> 2x daily for years. Added pepcid at night. Still having significant nightly GERD with aspiration episodes.         Gastroesophageal Reflux  She complains of abdominal pain, coughing, heartburn, nausea, a sore throat, water brash and wheezing. She reports no chest pain. aspiration. This is a chronic problem. The current episode started more than 1 year ago. The problem occurs constantly. The problem has been rapidly worsening. The symptoms are aggravated by lying down and certain foods. Pertinent negatives include no fatigue. She has tried a histamine-2 antagonist and a PPI for the symptoms. The treatment provided no relief.     Review of Systems   Constitutional:  Negative for activity change, appetite change, fatigue, fever and unexpected weight change.   HENT:  Positive for sore throat and trouble swallowing.    Respiratory:  Positive for cough, shortness of breath and wheezing.    Cardiovascular:  Negative for chest pain.   Gastrointestinal:  Positive for abdominal pain, heartburn and nausea. Negative for abdominal distention, anal bleeding, blood in stool, constipation, diarrhea, rectal pain and vomiting.       No LMP recorded. Patient is postmenopausal.  Past Medical History:   Diagnosis Date    Heartburn      Past Surgical History:   Procedure Laterality Date    ANTERIOR CRUCIATE LIGAMENT REPAIR      AUGMENTATION OF BREAST      BREAST SURGERY       SECTION      CHOLECYSTECTOMY      FOOT SURGERY      LEFT HEART CATHETERIZATION Left 2024    Procedure: Left heart cath;  Surgeon: Orion Castellanos MD;  Location: [facility] CATH/EP LAB;  Service: Cardiology;  Laterality: Left;    TONSILLECTOMY       Family History   Problem Relation Name Age of Onset     Heart disease Father       Social History     Tobacco Use    Smoking status: Former     Types: Cigarettes    Smokeless tobacco: Former    Tobacco comments:     Ex smoker 12 years   Substance Use Topics    Alcohol use: Yes     Comment: occassionally     Wt Readings from Last 10 Encounters:   07/09/24 114.3 kg (251 lb 15.8 oz)   02/14/24 116.3 kg (256 lb 6.3 oz)   01/31/24 116.3 kg (256 lb 6.4 oz)   01/28/24 113.4 kg (250 lb)   10/03/18 90.7 kg (200 lb)   06/13/18 86.2 kg (190 lb)     Lab Results   Component Value Date    WBC 8.35 01/28/2024    HGB 11.8 (L) 01/28/2024    HCT 35.2 (L) 01/28/2024    MCV 85 01/28/2024     01/28/2024     CMP  Sodium   Date Value Ref Range Status   01/29/2024 137 136 - 145 mmol/L Final     Potassium   Date Value Ref Range Status   01/29/2024 3.9 3.5 - 5.1 mmol/L Final     Chloride   Date Value Ref Range Status   01/29/2024 100 95 - 110 mmol/L Final     CO2   Date Value Ref Range Status   01/29/2024 28 23 - 29 mmol/L Final     Glucose   Date Value Ref Range Status   01/29/2024 109 70 - 110 mg/dL Final     BUN   Date Value Ref Range Status   01/29/2024 12 6 - 20 mg/dL Final     Creatinine   Date Value Ref Range Status   01/29/2024 0.8 0.5 - 1.4 mg/dL Final     Calcium   Date Value Ref Range Status   01/29/2024 9.3 8.7 - 10.5 mg/dL Final     Total Protein   Date Value Ref Range Status   01/27/2024 7.0 6.0 - 8.4 g/dL Final     Albumin   Date Value Ref Range Status   01/27/2024 4.0 3.5 - 5.2 g/dL Final     Total Bilirubin   Date Value Ref Range Status   01/27/2024 0.4 0.1 - 1.0 mg/dL Final     Comment:     For infants and newborns, interpretation of results should be based  on gestational age, weight and in agreement with clinical  observations.    Premature Infant recommended reference ranges:  Up to 24 hours.............<8.0 mg/dL  Up to 48 hours............<12.0 mg/dL  3-5 days..................<15.0 mg/dL  6-29 days.................<15.0 mg/dL       Alkaline Phosphatase   Date Value  "Ref Range Status   01/27/2024 81 55 - 135 U/L Final     AST   Date Value Ref Range Status   01/27/2024 16 10 - 40 U/L Final     ALT   Date Value Ref Range Status   01/27/2024 17 10 - 44 U/L Final     Anion Gap   Date Value Ref Range Status   01/29/2024 9 8 - 16 mmol/L Final     No results found for: "AMYLASE"  No results found for: "LIPASE"  No results found for: "LIPASERES"  Lab Results   Component Value Date    TSH 1.466 01/29/2024       Reviewed prior medical records including radiology report of no GI imaging & endoscopy history (see surgical history).    Objective:      Physical Exam  Vitals and nursing note reviewed.   Constitutional:       General: She is not in acute distress.     Appearance: She is obese. She is not ill-appearing.   HENT:      Head: Normocephalic and atraumatic.      Mouth/Throat:      Mouth: Mucous membranes are moist.      Pharynx: Oropharynx is clear.   Eyes:      Conjunctiva/sclera: Conjunctivae normal.   Cardiovascular:      Rate and Rhythm: Normal rate.      Pulses: Normal pulses.   Pulmonary:      Effort: Pulmonary effort is normal. No respiratory distress.   Abdominal:      General: Abdomen is flat. Bowel sounds are normal. There is no distension.      Palpations: Abdomen is soft.      Tenderness: There is no abdominal tenderness.   Skin:     General: Skin is warm and dry.      Capillary Refill: Capillary refill takes 2 to 3 seconds.   Neurological:      Mental Status: She is alert and oriented to person, place, and time.         Assessment:       1. Gastroesophageal reflux disease, unspecified whether esophagitis present    2. Aspiration into respiratory tract, initial encounter    3. Heartburn    4. Abi-Danlos disease    5. Wheezing        Plan:       Gastroesophageal reflux disease, unspecified whether esophagitis present, Aspiration into respiratory tract, initial encounter, Heartburn, Wheezing    -     sucralfate (CARAFATE) 1 gram tablet; Take 1 tablet (1 g total) by mouth 4 " (four) times daily before meals and nightly. for 10 days  Dispense: 40 tablet; Refill: 0  -     RABEprazole (ACIPHEX) 20 mg tablet; Take 1 tablet (20 mg total) by mouth once daily.  Dispense: 30 tablet; Refill: 11  -     RABEprazole (ACIPHEX) 20 mg tablet; Take 1 tablet (20 mg total) by mouth once daily.  Dispense: 30 tablet; Refill: 11    Abi-Danlos disease  [facility] with [facility] for continued evaluation and management    No follow-ups on file.      If no improvement in symptoms or symptoms worsen, call/follow-up at clinic or go to ER.       WENDY Dodson, SAM-C    Encounter includes face to face time and non-face to face time preparing to see the patient (eg, review of tests), obtaining and/or reviewing separately obtained history, documenting clinical information in the electronic or other health record, independently interpreting results (not separately reported) and communicating results to the patient/family/caregiver, or care coordination (not separately reported).     A dictation software program was used for this note. Please expect some simple typographical errors in this note.

## 2024-07-09 ENCOUNTER — OFFICE VISIT (OUTPATIENT)
Dept: GASTROENTEROLOGY | Facility: CLINIC | Age: 54
End: 2024-07-09
Payer: OTHER GOVERNMENT

## 2024-07-09 VITALS
BODY MASS INDEX: 39.55 KG/M2 | DIASTOLIC BLOOD PRESSURE: 76 MMHG | SYSTOLIC BLOOD PRESSURE: 118 MMHG | WEIGHT: 252 LBS | HEIGHT: 67 IN | HEART RATE: 86 BPM

## 2024-07-09 DIAGNOSIS — R06.2 WHEEZING: ICD-10-CM

## 2024-07-09 DIAGNOSIS — K21.9 GASTROESOPHAGEAL REFLUX DISEASE, UNSPECIFIED WHETHER ESOPHAGITIS PRESENT: Primary | Chronic | ICD-10-CM

## 2024-07-09 DIAGNOSIS — R12 HEARTBURN: ICD-10-CM

## 2024-07-09 DIAGNOSIS — Q79.60 EHLERS-DANLOS DISEASE: ICD-10-CM

## 2024-07-09 DIAGNOSIS — T17.908A ASPIRATION INTO RESPIRATORY TRACT, INITIAL ENCOUNTER: ICD-10-CM

## 2024-07-09 PROCEDURE — 99999 PR PBB SHADOW E&M-EST. PATIENT-LVL IV: CPT | Mod: PBBFAC,,,

## 2024-07-09 PROCEDURE — 99204 OFFICE O/P NEW MOD 45 MIN: CPT | Mod: S$PBB,,,

## 2024-07-09 PROCEDURE — 99214 OFFICE O/P EST MOD 30 MIN: CPT | Mod: PBBFAC,PN

## 2024-07-09 RX ORDER — RABEPRAZOLE SODIUM 20 MG/1
20 TABLET, DELAYED RELEASE ORAL DAILY
Qty: 30 TABLET | Refills: 11 | Status: SHIPPED | OUTPATIENT
Start: 2024-07-09 | End: 2025-07-09

## 2024-07-09 RX ORDER — SUCRALFATE 1 G/1
1 TABLET ORAL
Qty: 40 TABLET | Refills: 0 | Status: SHIPPED | OUTPATIENT
Start: 2024-07-09 | End: 2024-07-19

## 2024-07-09 RX ORDER — RABEPRAZOLE SODIUM 20 MG/1
20 TABLET, DELAYED RELEASE ORAL DAILY
Qty: 30 TABLET | Refills: 11 | Status: SHIPPED | OUTPATIENT
Start: 2024-07-09 | End: 2024-07-09

## 2024-07-09 NOTE — PATIENT INSTRUCTIONS
Instructions for Outpatient Endoscopy    PROCEDURE DATE:   REPORT TO [facility] REGISTRATION ([address] [place] [address] Maya Matthew. [id number]) ONE HOUR BEFORE PROCEDURE    NO BLOOD THINNERS/NO ASPIRIN (OK to continue Aspirin 81mg) OR MEDICATIONS CONTAINING ASPIRIN, MOTRIN, IBUPROFEN, ALEVE, OR ANY ANTI-INFLAMMATORY MEDICATIONS FOR 7 DAYS PRIOR TO PROCEDURE. TYLENOL IS OK.      Please hold the following diabetic/weight loss medications 1 week prior to procedure:   Dulaglutide (Trulicity)  Exenatide extended release (Bydureon bcise)  Semaglutide (Ozempic, Wegovy)  Tirzepatide (Mounjaro, Zepbound)  Please hold the following medications the day before your procedure:   Exenatide (Byetta)   Liraglutide (Victoza, Saxenda)   Lixisenatide (Adlyxin)   Semaglutide (Rybelsus)     Clear Liquid diet the whole day before your procedure.     Nothing by mouth after midnight or the morning of EGD.    Ok to take morning medications with small sip water by 5:30am (except no Diabetic medications)    SINCE SEDATION IS USED, YOU MUST HAVE SOMEONE TO DRIVE YOU HOME/NO TAXI  Please call [phone] with any questions.      If you do not take any Diabetic/weight loss medication, please follow the following prep instructions.    Eat a light evening meal the night before your Endoscopy.   (Soup, Salad, Gilliam, or grilled chicken)    Nothing by mouth after midnight or the morning of EGD.    Ok to take morning medications with small sip water by 5:30am (except no Diabetic medications)    SINCE SEDATION IS USED, YOU MUST HAVE SOMEONE TO DRIVE YOU HOME/NO TAXI.

## 2024-07-29 ENCOUNTER — ANESTHESIA EVENT (OUTPATIENT)
Dept: ENDOSCOPY | Facility: HOSPITAL | Age: 54
End: 2024-07-29
Payer: OTHER GOVERNMENT

## 2024-07-29 ENCOUNTER — HOSPITAL ENCOUNTER (OUTPATIENT)
Facility: HOSPITAL | Age: 54
Discharge: HOME | End: 2024-07-29
Attending: INTERNAL MEDICINE | Admitting: INTERNAL MEDICINE
Payer: OTHER GOVERNMENT

## 2024-07-29 ENCOUNTER — ANESTHESIA (OUTPATIENT)
Dept: ENDOSCOPY | Facility: HOSPITAL | Age: 54
End: 2024-07-29
Payer: OTHER GOVERNMENT

## 2024-07-29 VITALS
OXYGEN SATURATION: 98 % | TEMPERATURE: 98 F | HEART RATE: 74 BPM | BODY MASS INDEX: 39.31 KG/M2 | RESPIRATION RATE: 14 BRPM | DIASTOLIC BLOOD PRESSURE: 82 MMHG | SYSTOLIC BLOOD PRESSURE: 124 MMHG | WEIGHT: 251 LBS

## 2024-07-29 DIAGNOSIS — K21.9 GERD (GASTROESOPHAGEAL REFLUX DISEASE): ICD-10-CM

## 2024-07-29 DIAGNOSIS — K20.90 ESOPHAGITIS: ICD-10-CM

## 2024-07-29 DIAGNOSIS — K44.9 HIATAL HERNIA: Primary | ICD-10-CM

## 2024-07-29 DIAGNOSIS — K29.70 GASTRITIS, PRESENCE OF BLEEDING UNSPECIFIED, UNSPECIFIED CHRONICITY, UNSPECIFIED GASTRITIS TYPE: ICD-10-CM

## 2024-07-29 PROCEDURE — 25000003 PHARM REV CODE 250: Performed by: NURSE ANESTHETIST, CERTIFIED REGISTERED

## 2024-07-29 PROCEDURE — 43239 EGD BIOPSY SINGLE/MULTIPLE: CPT | Performed by: INTERNAL MEDICINE

## 2024-07-29 PROCEDURE — 25000003 PHARM REV CODE 250: Performed by: INTERNAL MEDICINE

## 2024-07-29 PROCEDURE — 63600175 PHARM REV CODE 636 W HCPCS: Performed by: NURSE ANESTHETIST, CERTIFIED REGISTERED

## 2024-07-29 PROCEDURE — 88305 TISSUE EXAM BY PATHOLOGIST: CPT | Mod: TC,59 | Performed by: PATHOLOGY

## 2024-07-29 PROCEDURE — 37000008 HC ANESTHESIA 1ST 15 MINUTES: Performed by: INTERNAL MEDICINE

## 2024-07-29 PROCEDURE — 27201012 HC FORCEPS, HOT/COLD, DISP: Performed by: INTERNAL MEDICINE

## 2024-07-29 PROCEDURE — D9220A PRA ANESTHESIA: Mod: CRNA,,, | Performed by: NURSE ANESTHETIST, CERTIFIED REGISTERED

## 2024-07-29 PROCEDURE — D9220A PRA ANESTHESIA: Mod: ANES,,, | Performed by: ANESTHESIOLOGY

## 2024-07-29 PROCEDURE — 37000009 HC ANESTHESIA EA ADD 15 MINS: Performed by: INTERNAL MEDICINE

## 2024-07-29 PROCEDURE — 43239 EGD BIOPSY SINGLE/MULTIPLE: CPT | Mod: ,,, | Performed by: INTERNAL MEDICINE

## 2024-07-29 RX ORDER — SODIUM CHLORIDE 9 MG/ML
INJECTION, SOLUTION INTRAVENOUS CONTINUOUS
Status: DISCONTINUED | OUTPATIENT
Start: 2024-07-29 | End: 2024-07-29 | Stop reason: HOSPADM

## 2024-07-29 RX ORDER — IBUPROFEN 800 MG/1
800 TABLET ORAL 3 TIMES DAILY
COMMUNITY

## 2024-07-29 RX ORDER — LIDOCAINE HYDROCHLORIDE 20 MG/ML
INJECTION INTRAVENOUS
Status: DISCONTINUED | OUTPATIENT
Start: 2024-07-29 | End: 2024-07-29

## 2024-07-29 RX ORDER — PROPOFOL 10 MG/ML
VIAL (ML) INTRAVENOUS
Status: DISCONTINUED | OUTPATIENT
Start: 2024-07-29 | End: 2024-07-29

## 2024-07-29 RX ADMIN — PROPOFOL 50 MG: 10 INJECTION, EMULSION INTRAVENOUS at 01:07

## 2024-07-29 RX ADMIN — PROPOFOL 150 MG: 10 INJECTION, EMULSION INTRAVENOUS at 01:07

## 2024-07-29 RX ADMIN — SODIUM CHLORIDE: 9 INJECTION, SOLUTION INTRAVENOUS at 11:07

## 2024-07-29 RX ADMIN — LIDOCAINE HYDROCHLORIDE 100 MG: 20 INJECTION, SOLUTION INTRAVENOUS at 01:07

## 2024-07-29 RX ADMIN — HYDROCORTISONE SODIUM SUCCINATE 100 MG: 100 INJECTION, POWDER, FOR SOLUTION INTRAMUSCULAR; INTRAVENOUS at 01:07

## 2024-07-29 NOTE — ANESTHESIA POSTPROCEDURE EVALUATION
Anesthesia Post Evaluation    Patient: Emily Bryant    Procedure(s) Performed: Procedure(s) (LRB):  EGD (ESOPHAGOGASTRODUODENOSCOPY) wants earlier appt if available (N/A)    Final Anesthesia Type: general      Patient location during evaluation: PACU  Patient participation: Yes- Able to Participate  Level of consciousness: awake and alert and oriented  Post-procedure vital signs: reviewed and stable  Pain management: adequate  Airway patency: patent    PONV status at discharge: No PONV  Anesthetic complications: no      Cardiovascular status: blood pressure returned to baseline  Respiratory status: unassisted, spontaneous ventilation and room air  Hydration status: euvolemic  Follow-up not needed.              Vitals Value Taken Time   /82 07/29/24 1355   Temp 36.8 °C (98.2 °F) 07/29/24 1355   Pulse 74 07/29/24 1355   Resp 14 07/29/24 1355   SpO2 98 % 07/29/24 1355         Event Time   Out of Recovery 13:57:59         Pain/Miriam Score: Miriam Score: 10 (7/29/2024  1:55 PM)

## 2024-07-29 NOTE — TRANSFER OF CARE
Anesthesia Transfer of Care Note    Patient: Emily Bryant    Procedure(s) Performed: Procedure(s) (LRB):  EGD (ESOPHAGOGASTRODUODENOSCOPY) wants earlier appt if available (N/A)    Patient location: GI    Anesthesia Type: general    Transport from OR: Transported from OR on room air with adequate spontaneous ventilation    Post pain: adequate analgesia    Post assessment: no apparent anesthetic complications    Post vital signs: stable    Level of consciousness: awake    Nausea/Vomiting: no nausea/vomiting    Complications: none    Transfer of care protocol was followed      Last vitals: Visit Vitals  /76 (BP Location: Left arm, Patient Position: Lying)   Pulse 80   Temp 36.8 °C (98.2 °F) (Skin)   Resp 18   Wt 113.9 kg (251 lb)   SpO2 97%   Breastfeeding No   BMI 39.31 kg/m²

## 2024-07-29 NOTE — PLAN OF CARE
Vss, william po fluids, denies pain, ambulates easily. IV removed, catheter intact. Discharge instructions provided and states understanding. States ready to go home.  Discharged from facility with family per wheelchair.

## 2024-07-29 NOTE — [employer]ATION PATIENT INSTRUCTIONS
Discharge Summary/Instructions after an Endoscopic Procedure  Patient Name: Emily Bryant  Patient MRN: [id number]  Patient YOB: 1970  Monday, July 29, 2024  Bill Best MD  Dear patient,  As a result of recent federal legislation (The Federal Cures Act), you may   receive lab or pathology results from your procedure in your [facility]   account before your physician is able to contact you. Your physician or   their representative will relay the results to you with their   recommendations at their soonest availability.  Thank you,  RESTRICTIONS:  During your procedure today, you received medications for sedation.  These   medications may affect your judgment, balance and coordination.  Therefore,   for 24 hours, you have the following restrictions:   - DO NOT drive a car, operate machinery, make legal/financial decisions,   sign important papers or drink alcohol.    ACTIVITY:  Today: no heavy lifting, straining or running due to procedural   sedation/anesthesia.  The following day: return to full activity including work.  DIET:  Eat and drink normally unless instructed otherwise.     TREATMENT FOR COMMON SIDE EFFECTS:  - Mild abdominal pain, nausea, belching, bloating or excessive gas:  rest,   eat lightly and use a heating pad.  - Sore Throat: treat with throat lozenges and/or gargle with warm salt   water.  - Because air was used during the procedure, expelling large amounts of air   from your rectum or belching is normal.  - If a bowel prep was taken, you may not have a bowel movement for 1-3 days.    This is normal.  SYMPTOMS TO WATCH FOR AND REPORT TO YOUR PHYSICIAN:  1. Abdominal pain or bloating, other than gas cramps.  2. Chest pain.  3. Back pain.  4. Signs of infection such as: chills or fever occurring within 24 hours   after the procedure.  5. Rectal bleeding, which would show as bright red, maroon, or black stools.   (A tablespoon of blood from the rectum is not serious, especially  if   hemorrhoids are present.)  6. Vomiting.  7. Weakness or dizziness.  GO DIRECTLY TO THE NEAREST EMERGENCY ROOM IF YOU HAVE ANY OF THE FOLLOWING:      Difficulty breathing              Chills and/or fever over 101 F   Persistent vomiting and/or vomiting blood   Severe abdominal pain   Severe chest pain   Black, tarry stools   Bleeding- more than one tablespoon   Any other symptom or condition that you feel may need urgent attention  Your doctor recommends these additional instructions:  If any biopsies were taken, your doctors clinic will contact you in 1 to 2   weeks with any results.  - Patient has a contact number available for emergencies.  The signs and   symptoms of potential delayed complications were discussed with the   patient.  Return to normal activities tomorrow.  Written discharge   instructions were provided to the patient.   - Resume previous diet.   - Continue present medications.   - No aspirin, ibuprofen, naproxen, or other non-steroidal anti-inflammatory   drugs.   - Await pathology results.   - Discharge patient to home (ambulatory).   - Follow an antireflux regimen.   - Return to GI office after studies are complete.  For questions, problems or results please call your physician - Bill Best MD at Work:  [phone].  [facility], EMERGENCY ROOM PHONE NUMBER: [phone]  IF A COMPLICATION OR EMERGENCY SITUATION ARISES AND YOU ARE UNABLE TO REACH   YOUR PHYSICIAN - GO DIRECTLY TO THE EMERGENCY ROOM.  Bill Best MD  7/29/2024 1:33:35 PM  This report has been verified and signed electronically.  Dear patient,  As a result of recent federal legislation (The Federal Cures Act), you may   receive lab or pathology results from your procedure in your [facility]   account before your physician is able to contact you. Your physician or   their representative will relay the results to you with their   recommendations at their soonest availability.  Thank you,  [employer]ATION

## 2024-07-29 NOTE — ANESTHESIA PREPROCEDURE EVALUATION
07/29/2024  Emily Bryant is a 53 y.o., female.      Pre-op Assessment    I have reviewed the Patient Summary Reports.     I have reviewed the Nursing Notes. I have reviewed the NPO Status.   I have reviewed the Medications.     Review of Systems  Anesthesia Hx:             Denies Family Hx of Anesthesia complications.    Denies Personal Hx of Anesthesia complications.                    Cardiovascular:     Hypertension                                        Hepatic/GI:     GERD, poorly controlled             Musculoskeletal:         Spine Disorders: lumbar Chronic Pain           Endocrine:     Steroid dependent      Obesity / BMI > 30      Physical Exam  General: Cooperative, Alert and Oriented    Airway:  Mallampati: III   Mouth Opening: Small, but > 3cm  TM Distance: Normal  Tongue: Normal  Neck ROM: Normal ROM  Neck: Girth Increased        Anesthesia Plan  Type of Anesthesia, risks & benefits discussed:    Anesthesia Type: Gen Natural Airway  Intra-op Monitoring Plan: Standard ASA Monitors  Induction:  IV  Informed Consent: Informed consent signed with the Patient and all parties understand the risks and agree with anesthesia plan.  All questions answered.   ASA Score: 3    Ready For Surgery From Anesthesia Perspective.     .

## 2024-08-05 ENCOUNTER — PATIENT MESSAGE (OUTPATIENT)
Dept: GASTROENTEROLOGY | Facility: CLINIC | Age: 54
End: 2024-08-05
Payer: OTHER GOVERNMENT

## 2024-08-05 DIAGNOSIS — K44.9 LARGE HIATAL HERNIA: ICD-10-CM

## 2024-08-05 DIAGNOSIS — T17.908A ASPIRATION INTO RESPIRATORY TRACT, INITIAL ENCOUNTER: Primary | ICD-10-CM

## 2024-08-08 ENCOUNTER — TELEPHONE (OUTPATIENT)
Dept: GASTROENTEROLOGY | Facility: CLINIC | Age: 54
End: 2024-08-08
Payer: OTHER GOVERNMENT

## 2024-10-03 DIAGNOSIS — M17.4 OTHER BILATERAL SECONDARY OSTEOARTHRITIS OF KNEE: Primary | ICD-10-CM

## 2024-10-21 ENCOUNTER — CLINICAL SUPPORT (OUTPATIENT)
Facility: HOSPITAL | Age: 54
End: 2024-10-21
Payer: OTHER GOVERNMENT

## 2024-10-21 DIAGNOSIS — M17.4 OTHER BILATERAL SECONDARY OSTEOARTHRITIS OF KNEE: Primary | ICD-10-CM

## 2024-10-21 PROCEDURE — 97811 ACUP 1/> W/O ESTIM EA ADD 15: CPT | Mod: PO

## 2024-10-21 PROCEDURE — 97810 ACUP 1/> WO ESTIM 1ST 15 MIN: CPT | Mod: PO

## 2024-10-24 NOTE — PROGRESS NOTES
Acupuncture Evaluation Note     Name: Emily Bryant  Clinic Number: [id number]    Traditional Chinese Medicine (TCM) Diagnosis: Qi Stagnation  Medical Diagnosis:   Encounter Diagnosis   Name Primary?    Other bilateral secondary osteoarthritis of knee Yes        Evaluation Date: 10/21/24    Visit #/Visits authorized:1/12    Precautions: Standard    Subjective     Chief Concern: Osteoarthritis of knee; knee and back and joint pain , chronic; impedes movement and quality of life    Medical necessity is demonstrated by the following IMPAIRMENTS: Medical Necessity: Decreased mobility limits day to day activities, social, and emergent situations              Aggravating Factors:  movement and pressure   Relieving Factors:  rest    Symptom Description:     Quality:  Aching  Severity5 but varies    Frequency:  continuously    Previous Treatments Tried:  Medication    HEENT:  not noted    Chest:  not noted    Digestion:     Diet: normal   Fluids: no unusual caffeine/alcohol/fluids history  Taste/Appetite: normal   Symptoms: no blood in stool    Sleep: could improve    Energy Levels:  could improve    Psychological Symptoms:  not noted    Other Symptoms:  not noted    GYN Symptoms: not noted    Objective     Observation: clean and calm    Tongue:      Body:  normal   Color:  pink   Coating:  thin,     Pulse:        wiry       New Findings:  none    Treatment     Treatment Principles:  move qi    Acupuncture points used:  K3, UB 62, lima toward knee/GB 34, LI4, GB 20   Needles In: 10  Needles Out: 10  Needles W/O STIM placed: 3pm  [place] W/O STIM removed: 3:30pm      Other Traditional Chinese Medicine Modalities - none    Assessment     After treatment, patient felt more relaxed and less strain    Patient prognosis is Good.     Patient will continue to benefit from acupuncture treatment to address the deficits listed in the problem list box on initial evaluation, provide patient family education and to maximize pt's  level of independence in the home and community environment.     Patient's spiritual, cultural and educational needs considered and pt agreeable to plan of care and goals.     Anticipated barriers to treatment: none    Plan     Recommend every week or two for 12 sessions with midway re-assess.      Education:  Patient is aware of cumulative benefit of acupuncture

## 2024-11-04 ENCOUNTER — CLINICAL SUPPORT (OUTPATIENT)
Facility: HOSPITAL | Age: 54
End: 2024-11-04
Payer: OTHER GOVERNMENT

## 2024-11-04 DIAGNOSIS — M17.4 OTHER BILATERAL SECONDARY OSTEOARTHRITIS OF KNEE: Primary | ICD-10-CM

## 2024-11-04 PROCEDURE — 97811 ACUP 1/> W/O ESTIM EA ADD 15: CPT | Mod: PO

## 2024-11-04 PROCEDURE — 97810 ACUP 1/> WO ESTIM 1ST 15 MIN: CPT | Mod: PO

## 2024-11-06 NOTE — PROGRESS NOTES
Acupuncture Evaluation Note     Name: Emily Bryant  Clinic Number: [id number]    Traditional Chinese Medicine (TCM) Diagnosis: Qi Stagnation  Medical Diagnosis:   Encounter Diagnosis   Name Primary?    Other bilateral secondary osteoarthritis of knee Yes        Evaluation Date: 11/4/24    Visit #/Visits authorized:2/12    Precautions: Standard    Subjective     Chief Concern: Osteoarthritis of knee; knee and back and joint pain , chronic; impedes movement and quality of life    Medical necessity is demonstrated by the following IMPAIRMENTS: Medical Necessity: Decreased mobility limits day to day activities, social, and emergent situations              Aggravating Factors:  movement and pressure   Relieving Factors:  rest    Symptom Description:     Quality:  Aching  Severity5 but varies    Frequency:  continuously    Previous Treatments Tried:  Medication    HEENT:  not noted    Chest:  not noted    Digestion:     Diet: normal   Fluids: no unusual caffeine/alcohol/fluids history  Taste/Appetite: normal   Symptoms: no blood in stool    Sleep: could improve    Energy Levels:  could improve    Psychological Symptoms:  not noted    Other Symptoms:  not noted    GYN Symptoms: not noted    Objective     Observation: clean and calm    Tongue:      Body:  normal   Color:  pink   Coating:  thin,     Pulse:        wiry       New Findings:  none    Treatment     Treatment Principles:  move qi    Acupuncture points used:  K3, UB 62, lima toward knee/GB 34, LI4, GB 20   Needles In: 10  Needles Out: 10  Needles W/O STIM placed: 2pm  [place] W/O STIM removed: 2:30pm      Other Traditional Chinese Medicine Modalities - none    Assessment     After treatment, patient felt more relaxed and less strain    Patient prognosis is Good.     Patient will continue to benefit from acupuncture treatment to address the deficits listed in the problem list box on initial evaluation, provide patient family education and to maximize pt's  Neurosurgery  called in to do assessment via phone, leg strength and mobility were assessed. Cira Dowd, [phone] was present via speaker phone to instruct nurse on strength assessment. MRI has been ordered for the tomorrow AM, Neurosurgery will see pt tomorrow. level of independence in the home and community environment.     Patient's spiritual, cultural and educational needs considered and pt agreeable to plan of care and goals.     Anticipated barriers to treatment: none    Plan     Recommend every week or two for 12 sessions with midway re-assess.      Education:  Patient is aware of cumulative benefit of acupuncture

## 2024-11-12 ENCOUNTER — CLINICAL SUPPORT (OUTPATIENT)
Facility: HOSPITAL | Age: 54
End: 2024-11-12
Payer: OTHER GOVERNMENT

## 2024-11-12 DIAGNOSIS — M17.4 OTHER BILATERAL SECONDARY OSTEOARTHRITIS OF KNEE: Primary | ICD-10-CM

## 2024-11-12 PROCEDURE — 97811 ACUP 1/> W/O ESTIM EA ADD 15: CPT | Mod: PO

## 2024-11-12 PROCEDURE — 97810 ACUP 1/> WO ESTIM 1ST 15 MIN: CPT | Mod: PO

## 2024-11-15 NOTE — PROGRESS NOTES
Acupuncture Evaluation Note     Name: Emily Bryant  Clinic Number: [id number]    Traditional Chinese Medicine (TCM) Diagnosis: Qi Stagnation  Medical Diagnosis:   Encounter Diagnosis   Name Primary?    Other bilateral secondary osteoarthritis of knee Yes        Evaluation Date: 11/11/24    Visit #/Visits authorized:3/12    Precautions: Standard    Subjective     Chief Concern: Osteoarthritis of knee; knee and back and joint pain , chronic; impedes movement and quality of life    Medical necessity is demonstrated by the following IMPAIRMENTS: Medical Necessity: Decreased mobility limits day to day activities, social, and emergent situations              Aggravating Factors:  movement and pressure   Relieving Factors:  rest    Symptom Description:     Quality:  Aching  Severity5 but varies    Frequency:  continuously    Previous Treatments Tried:  Medication    HEENT:  not noted    Chest:  not noted    Digestion:     Diet: normal   Fluids: no unusual caffeine/alcohol/fluids history  Taste/Appetite: normal   Symptoms: no blood in stool    Sleep: could improve    Energy Levels:  could improve    Psychological Symptoms:  not noted    Other Symptoms:  not noted    GYN Symptoms: not noted    Objective     Observation: clean and calm    Tongue:      Body:  normal   Color:  pink   Coating:  thin,     Pulse:        wiry       New Findings:  none    Treatment     Treatment Principles:  move qi    Acupuncture points used:  K3, UB 62, lima toward knee/GB 34, LI4, GB 20   Needles In: 10  Needles Out: 10  Needles W/O STIM placed: 10am  [place] W/O STIM removed: 10:30am      Other Traditional Chinese Medicine Modalities - none    Assessment     After treatment, patient felt more relaxed and less strain    Patient prognosis is Good.     Patient will continue to benefit from acupuncture treatment to address the deficits listed in the problem list box on initial evaluation, provide patient family education and to maximize pt's  level of independence in the home and community environment.     Patient's spiritual, cultural and educational needs considered and pt agreeable to plan of care and goals.     Anticipated barriers to treatment: none    Plan     Recommend every week or two for 12 sessions with midway re-assess.      Education:  Patient is aware of cumulative benefit of acupuncture

## 2024-11-25 ENCOUNTER — CLINICAL SUPPORT (OUTPATIENT)
Facility: HOSPITAL | Age: 54
End: 2024-11-25
Payer: OTHER GOVERNMENT

## 2024-11-25 DIAGNOSIS — M17.4 OTHER BILATERAL SECONDARY OSTEOARTHRITIS OF KNEE: Primary | ICD-10-CM

## 2024-11-25 PROCEDURE — 97811 ACUP 1/> W/O ESTIM EA ADD 15: CPT | Mod: PO

## 2024-11-25 PROCEDURE — 97810 ACUP 1/> WO ESTIM 1ST 15 MIN: CPT | Mod: PO

## 2024-11-27 NOTE — PROGRESS NOTES
Acupuncture Evaluation Note     Name: Emily Bryant  Clinic Number: [id number]    Traditional Chinese Medicine (TCM) Diagnosis: Qi Stagnation  Medical Diagnosis:   Encounter Diagnosis   Name Primary?    Other bilateral secondary osteoarthritis of knee Yes        Evaluation Date: 11/25/24    Visit #/Visits authorized:4/12    Precautions: Standard    Subjective     Chief Concern: Osteoarthritis of knee; knee and back and joint pain , chronic; impedes movement and quality of life    Medical necessity is demonstrated by the following IMPAIRMENTS: Medical Necessity: Decreased mobility limits day to day activities, social, and emergent situations              Aggravating Factors:  movement and pressure   Relieving Factors:  rest    Symptom Description:     Quality:  Aching  Severity5 but varies    Frequency:  continuously    Previous Treatments Tried:  Medication    HEENT:  not noted    Chest:  not noted    Digestion:     Diet: normal   Fluids: no unusual caffeine/alcohol/fluids history  Taste/Appetite: normal   Symptoms: no blood in stool    Sleep: could improve    Energy Levels:  could improve    Psychological Symptoms:  not noted    Other Symptoms:  not noted    GYN Symptoms: not noted    Objective     Observation: clean and calm    Tongue:      Body:  normal   Color:  pink   Coating:  thin,     Pulse:        wiry       New Findings:  none    Treatment     Treatment Principles:  move qi    Acupuncture points used:  K3, UB 62, lima toward knee/GB 34, LI4, GB 20   Needles In: 10  Needles Out: 10  Needles W/O STIM placed: 3pm  [place] W/O STIM removed: 3:30pm      Other Traditional Chinese Medicine Modalities - none    Assessment     After treatment, patient felt more relaxed and less strain    Patient prognosis is Good.     Patient will continue to benefit from acupuncture treatment to address the deficits listed in the problem list box on initial evaluation, provide patient family education and to maximize pt's  level of independence in the home and community environment.     Patient's spiritual, cultural and educational needs considered and pt agreeable to plan of care and goals.     Anticipated barriers to treatment: none    Plan     Recommend every week or two for 12 sessions with midway re-assess.      Education:  Patient is aware of cumulative benefit of acupuncture

## 2024-12-02 ENCOUNTER — CLINICAL SUPPORT (OUTPATIENT)
Facility: HOSPITAL | Age: 54
End: 2024-12-02
Payer: OTHER GOVERNMENT

## 2024-12-02 DIAGNOSIS — M17.4 OTHER BILATERAL SECONDARY OSTEOARTHRITIS OF KNEE: Primary | ICD-10-CM

## 2024-12-02 PROCEDURE — 97810 ACUP 1/> WO ESTIM 1ST 15 MIN: CPT | Mod: PO

## 2024-12-02 PROCEDURE — 97811 ACUP 1/> W/O ESTIM EA ADD 15: CPT | Mod: PO

## 2024-12-04 NOTE — PROGRESS NOTES
Acupuncture Evaluation Note     Name: Emily Bryant  Clinic Number: [id number]    Traditional Chinese Medicine (TCM) Diagnosis: Qi Stagnation  Medical Diagnosis:   Encounter Diagnosis   Name Primary?    Other bilateral secondary osteoarthritis of knee Yes        Evaluation Date: 12/2/24    Visit #/Visits authorized:5/12    Precautions: Standard    Subjective     Chief Concern: Osteoarthritis of knee; knee and back and joint pain , chronic; impedes movement and quality of life    Medical necessity is demonstrated by the following IMPAIRMENTS: Medical Necessity: Decreased mobility limits day to day activities, social, and emergent situations              Aggravating Factors:  movement and pressure   Relieving Factors:  rest    Symptom Description:     Quality:  Aching  Severity5 but varies    Frequency:  continuously    Previous Treatments Tried:  Medication    HEENT:  not noted    Chest:  not noted    Digestion:     Diet: normal   Fluids: no unusual caffeine/alcohol/fluids history  Taste/Appetite: normal   Symptoms: no blood in stool    Sleep: could improve    Energy Levels:  could improve    Psychological Symptoms:  not noted    Other Symptoms:  not noted    GYN Symptoms: not noted    Objective     Observation: clean and calm    Tongue:      Body:  normal   Color:  pink   Coating:  thin,     Pulse:        wiry       New Findings:  none    Treatment     Treatment Principles:  move qi    Acupuncture points used:  K3, UB 62, lima toward knee/GB 34, LI4, GB 20   Needles In: 10  Needles Out: 10  Needles W/O STIM placed: 3pm  [place] W/O STIM removed: 3:30pm      Other Traditional Chinese Medicine Modalities - none    Assessment     After treatment, patient felt more relaxed and less strain    Patient prognosis is Good.     Patient will continue to benefit from acupuncture treatment to address the deficits listed in the problem list box on initial evaluation, provide patient family education and to maximize pt's  level of independence in the home and community environment.     Patient's spiritual, cultural and educational needs considered and pt agreeable to plan of care and goals.     Anticipated barriers to treatment: none    Plan     Recommend every week or two for 12 sessions with midway re-assess.      Education:  Patient is aware of cumulative benefit of acupuncture

## 2024-12-17 ENCOUNTER — CLINICAL SUPPORT (OUTPATIENT)
Facility: HOSPITAL | Age: 54
End: 2024-12-17
Payer: OTHER GOVERNMENT

## 2024-12-17 DIAGNOSIS — M17.4 OTHER BILATERAL SECONDARY OSTEOARTHRITIS OF KNEE: Primary | ICD-10-CM

## 2024-12-17 PROCEDURE — 97811 ACUP 1/> W/O ESTIM EA ADD 15: CPT | Mod: PO

## 2024-12-17 PROCEDURE — 97810 ACUP 1/> WO ESTIM 1ST 15 MIN: CPT | Mod: PO

## 2024-12-19 NOTE — PROGRESS NOTES
Acupuncture Evaluation Note     Name: Emily Bryant  Clinic Number: [id number]    Traditional Chinese Medicine (TCM) Diagnosis: Qi Stagnation  Medical Diagnosis:   Encounter Diagnosis   Name Primary?    Other bilateral secondary osteoarthritis of knee Yes        Evaluation Date: 12/17/24    Visit #/Visits authorized:6/12    Precautions: Standard    Subjective     Chief Concern: Osteoarthritis of knee; knee and back and joint pain , chronic; impedes movement and quality of life    Medical necessity is demonstrated by the following IMPAIRMENTS: Medical Necessity: Decreased mobility limits day to day activities, social, and emergent situations              Aggravating Factors:  movement and pressure   Relieving Factors:  rest    Symptom Description:     Quality:  Aching  Severity5 but varies    Frequency:  continuously    Previous Treatments Tried:  Medication    HEENT:  not noted    Chest:  not noted    Digestion:     Diet: normal   Fluids: no unusual caffeine/alcohol/fluids history  Taste/Appetite: normal   Symptoms: no blood in stool    Sleep: could improve    Energy Levels:  could improve    Psychological Symptoms:  not noted    Other Symptoms:  not noted    GYN Symptoms: not noted    Objective     Observation: clean and calm    Tongue:      Body:  normal   Color:  pink   Coating:  thin,     Pulse:        wiry       New Findings:  none    Treatment     Treatment Principles:  move qi    Acupuncture points used:  K3, UB 62, lima toward knee/GB 34, LI4, GB 20   Needles In: 10  Needles Out: 10  Needles W/O STIM placed: 3pm  [place] W/O STIM removed: 3:30pm      Other Traditional Chinese Medicine Modalities - none    Assessment     After treatment, patient felt more relaxed and less strain    Patient prognosis is Good.     Patient will continue to benefit from acupuncture treatment to address the deficits listed in the problem list box on initial evaluation, provide patient family education and to maximize pt's  level of independence in the home and community environment.     Patient's spiritual, cultural and educational needs considered and pt agreeable to plan of care and goals.     Anticipated barriers to treatment: none    Plan     Recommend every week or two for 12 sessions with midway re-assess.      Education:  Patient is aware of cumulative benefit of acupuncture

## 2025-01-06 ENCOUNTER — CLINICAL SUPPORT (OUTPATIENT)
Facility: HOSPITAL | Age: 55
End: 2025-01-06
Payer: OTHER GOVERNMENT

## 2025-01-06 DIAGNOSIS — M17.4 OTHER BILATERAL SECONDARY OSTEOARTHRITIS OF KNEE: Primary | ICD-10-CM

## 2025-01-06 PROCEDURE — 97810 ACUP 1/> WO ESTIM 1ST 15 MIN: CPT | Mod: PO

## 2025-01-06 PROCEDURE — 97811 ACUP 1/> W/O ESTIM EA ADD 15: CPT | Mod: PO

## 2025-01-09 NOTE — PROGRESS NOTES
Acupuncture Evaluation Note     Name: Emily Bryant  Clinic Number: [id number]    Traditional Chinese Medicine (TCM) Diagnosis: Qi Stagnation  Medical Diagnosis:   Encounter Diagnosis   Name Primary?    Other bilateral secondary osteoarthritis of knee Yes        Evaluation Date: 1/6/25    Visit #/Visits authorized:7/12    Precautions: Standard    Subjective     Chief Concern: Osteoarthritis of knee; knee and back and joint pain , chronic; impedes movement and quality of life    Medical necessity is demonstrated by the following IMPAIRMENTS: Medical Necessity: Decreased mobility limits day to day activities, social, and emergent situations              Aggravating Factors:  movement and pressure   Relieving Factors:  rest    Symptom Description:     Quality:  Aching  Severity5 but varies    Frequency:  continuously    Previous Treatments Tried:  Medication    HEENT:  not noted    Chest:  not noted    Digestion:     Diet: normal   Fluids: no unusual caffeine/alcohol/fluids history  Taste/Appetite: normal   Symptoms: no blood in stool    Sleep: could improve    Energy Levels:  could improve    Psychological Symptoms:  not noted    Other Symptoms:  not noted    GYN Symptoms: not noted    Objective     Observation: clean and calm    Tongue:      Body:  normal   Color:  pink   Coating:  thin,     Pulse:        wiry       New Findings:  none    Treatment     Treatment Principles:  move qi    Acupuncture points used:  K3, UB 62, lima toward knee/GB 34, LI4, GB 20   Needles In: 10  Needles Out: 10  Needles W/O STIM placed: 3pm  [place] W/O STIM removed: 3:30pm      Other Traditional Chinese Medicine Modalities - none    Assessment     After treatment, patient felt more relaxed and less strain    Patient prognosis is Good.     Patient will continue to benefit from acupuncture treatment to address the deficits listed in the problem list box on initial evaluation, provide patient family education and to maximize pt's  level of independence in the home and community environment.     Patient's spiritual, cultural and educational needs considered and pt agreeable to plan of care and goals.     Anticipated barriers to treatment: none    Plan     Recommend every week or two for 12 sessions with midway re-assess.      Education:  Patient is aware of cumulative benefit of acupuncture

## 2025-01-13 ENCOUNTER — CLINICAL SUPPORT (OUTPATIENT)
Facility: HOSPITAL | Age: 55
End: 2025-01-13
Payer: OTHER GOVERNMENT

## 2025-01-13 DIAGNOSIS — M17.4 OTHER BILATERAL SECONDARY OSTEOARTHRITIS OF KNEE: Primary | ICD-10-CM

## 2025-01-13 PROCEDURE — 97810 ACUP 1/> WO ESTIM 1ST 15 MIN: CPT | Mod: PO

## 2025-01-13 PROCEDURE — 97811 ACUP 1/> W/O ESTIM EA ADD 15: CPT | Mod: PO

## 2025-01-15 NOTE — PROGRESS NOTES
Acupuncture Evaluation Note     Name: Emily Bryant  Clinic Number: [id number]    Traditional Chinese Medicine (TCM) Diagnosis: Qi Stagnation  Medical Diagnosis:   Encounter Diagnosis   Name Primary?    Other bilateral secondary osteoarthritis of knee Yes        Evaluation Date: 1/13/25    Visit #/Visits authorized:8/12    Precautions: Standard    Subjective     Chief Concern: Osteoarthritis of knee; knee and back and joint pain , chronic; impedes movement and quality of life    Medical necessity is demonstrated by the following IMPAIRMENTS: Medical Necessity: Decreased mobility limits day to day activities, social, and emergent situations              Aggravating Factors:  movement and pressure   Relieving Factors:  rest    Symptom Description:     Quality:  Aching  Severity5 but varies    Frequency:  continuously    Previous Treatments Tried:  Medication    HEENT:  not noted    Chest:  not noted    Digestion:     Diet: normal   Fluids: no unusual caffeine/alcohol/fluids history  Taste/Appetite: normal   Symptoms: no blood in stool    Sleep: could improve    Energy Levels:  could improve    Psychological Symptoms:  not noted    Other Symptoms:  not noted    GYN Symptoms: not noted    Objective     Observation: clean and calm    Tongue:      Body:  normal   Color:  pink   Coating:  thin,     Pulse:        wiry       New Findings:  none    Treatment     Treatment Principles:  move qi    Acupuncture points used:  K3, UB 62, lima toward knee/GB 34, LI4, GB 20   Needles In: 10  Needles Out: 10  Needles W/O STIM placed: 3pm  [place] W/O STIM removed: 3:30pm      Other Traditional Chinese Medicine Modalities - none    Assessment     After treatment, patient felt more relaxed and less strain    Patient prognosis is Good.     Patient will continue to benefit from acupuncture treatment to address the deficits listed in the problem list box on initial evaluation, provide patient family education and to maximize pt's  level of independence in the home and community environment.     Patient's spiritual, cultural and educational needs considered and pt agreeable to plan of care and goals.     Anticipated barriers to treatment: none    Plan     Recommend every week or two for 12 sessions with midway re-assess.      Education:  Patient is aware of cumulative benefit of acupuncture

## 2025-02-12 ENCOUNTER — CLINICAL SUPPORT (OUTPATIENT)
Facility: HOSPITAL | Age: 55
End: 2025-02-12
Payer: OTHER GOVERNMENT

## 2025-02-12 DIAGNOSIS — M17.4 OTHER BILATERAL SECONDARY OSTEOARTHRITIS OF KNEE: Primary | ICD-10-CM

## 2025-02-12 PROCEDURE — 97811 ACUP 1/> W/O ESTIM EA ADD 15: CPT | Mod: PO

## 2025-02-12 PROCEDURE — 97810 ACUP 1/> WO ESTIM 1ST 15 MIN: CPT | Mod: PO

## 2025-02-14 NOTE — PROGRESS NOTES
Acupuncture Evaluation Note     Name: Emily Bryant  Clinic Number: [id number]    Traditional Chinese Medicine (TCM) Diagnosis: Qi Stagnation  Medical Diagnosis:   Encounter Diagnosis   Name Primary?    Other bilateral secondary osteoarthritis of knee Yes        Evaluation Date: 2/12/25    Visit #/Visits authorized:9/12    Precautions: Standard    Subjective     Chief Concern: Osteoarthritis of knee; knee and back and joint pain , chronic; impedes movement and quality of life    Medical necessity is demonstrated by the following IMPAIRMENTS: Medical Necessity: Decreased mobility limits day to day activities, social, and emergent situations              Aggravating Factors:  movement and pressure   Relieving Factors:  rest    Symptom Description:     Quality:  Aching  Severity5 but varies    Frequency:  continuously    Previous Treatments Tried:  Medication    HEENT:  not noted    Chest:  not noted    Digestion:     Diet: normal   Fluids: no unusual caffeine/alcohol/fluids history  Taste/Appetite: normal   Symptoms: no blood in stool    Sleep: could improve    Energy Levels:  could improve    Psychological Symptoms:  not noted    Other Symptoms:  not noted    GYN Symptoms: not noted    Objective     Observation: clean and calm    Tongue:      Body:  normal   Color:  pink   Coating:  thin,     Pulse:        wiry       New Findings:  none    Treatment     Treatment Principles:  move qi    Acupuncture points used:  K3, UB 62, lima toward knee/GB 34, LI4, GB 20   Needles In: 10  Needles Out: 10  Needles W/O STIM placed: 3pm  [place] W/O STIM removed: 3:30pm      Other Traditional Chinese Medicine Modalities - none    Assessment     After treatment, patient felt more relaxed and less strain    Patient prognosis is Good.     Patient will continue to benefit from acupuncture treatment to address the deficits listed in the problem list box on initial evaluation, provide patient family education and to maximize pt's  level of independence in the home and community environment.     Patient's spiritual, cultural and educational needs considered and pt agreeable to plan of care and goals.     Anticipated barriers to treatment: none    Plan     Recommend every week or two for 12 sessions with midway re-assess.      Education:  Patient is aware of cumulative benefit of acupuncture

## 2025-07-08 DIAGNOSIS — Z12.31 ENCOUNTER FOR SCREENING MAMMOGRAM FOR MALIGNANT NEOPLASM OF BREAST: Primary | ICD-10-CM

## 2025-07-14 ENCOUNTER — HOSPITAL ENCOUNTER (OUTPATIENT)
Dept: RADIOLOGY | Facility: HOSPITAL | Age: 55
Discharge: HOME | End: 2025-07-14
Attending: FAMILY MEDICINE
Payer: OTHER GOVERNMENT

## 2025-07-14 DIAGNOSIS — Z12.31 ENCOUNTER FOR SCREENING MAMMOGRAM FOR MALIGNANT NEOPLASM OF BREAST: ICD-10-CM

## 2025-07-14 PROCEDURE — 77063 BREAST TOMOSYNTHESIS BI: CPT | Mod: 26,,, | Performed by: RADIOLOGY

## 2025-07-14 PROCEDURE — 77067 SCR MAMMO BI INCL CAD: CPT | Mod: 26,,, | Performed by: RADIOLOGY

## 2025-07-14 PROCEDURE — 77063 BREAST TOMOSYNTHESIS BI: CPT | Mod: TC,PO

## (undated) DEVICE — GLIDESHEATH SLENDER SS 5FR10CM

## (undated) DEVICE — CATH DXTERITY JR40 100CM 5FR

## (undated) DEVICE — GUIDEWIRE INQWIRE SE 3MM JTIP

## (undated) DEVICE — CATH DXTERITY JL35 100CM 5FR

## (undated) DEVICE — HEMOSTAT VASC BAND REG 24CM